# Patient Record
Sex: FEMALE | Race: WHITE | Employment: OTHER | URBAN - METROPOLITAN AREA
[De-identification: names, ages, dates, MRNs, and addresses within clinical notes are randomized per-mention and may not be internally consistent; named-entity substitution may affect disease eponyms.]

---

## 2017-02-21 PROBLEM — G25.81 RLS (RESTLESS LEGS SYNDROME): Status: ACTIVE | Noted: 2017-02-21

## 2017-02-21 PROBLEM — G43.019 INTRACTABLE MIGRAINE WITHOUT AURA AND WITHOUT STATUS MIGRAINOSUS: Status: ACTIVE | Noted: 2017-02-21

## 2019-12-09 PROBLEM — E78.00 PURE HYPERCHOLESTEROLEMIA: Status: ACTIVE | Noted: 2019-12-09

## 2019-12-09 PROBLEM — M06.4 INFLAMMATORY POLYARTHROPATHY (HCC): Status: ACTIVE | Noted: 2019-12-09

## 2020-08-11 PROBLEM — M06.9 RHEUMATOID ARTHRITIS, INVOLVING UNSPECIFIED SITE, UNSPECIFIED RHEUMATOID FACTOR PRESENCE: Status: ACTIVE | Noted: 2020-08-11

## 2020-08-11 PROBLEM — M67.912 TENDINOPATHY OF LEFT ROTATOR CUFF: Status: ACTIVE | Noted: 2020-08-11

## 2020-08-11 PROBLEM — M75.42 SUBACROMIAL IMPINGEMENT OF LEFT SHOULDER: Status: ACTIVE | Noted: 2020-08-11

## 2020-08-11 PROBLEM — M67.922 BICEPS TENDINOPATHY, LEFT: Status: ACTIVE | Noted: 2020-08-11

## 2020-09-01 PROBLEM — M19.012 OSTEOARTHRITIS OF LEFT ACROMIOCLAVICULAR JOINT: Status: ACTIVE | Noted: 2020-09-01

## 2020-10-06 PROBLEM — M06.9 RHEUMATOID ARTHRITIS (HCC): Status: ACTIVE | Noted: 2020-08-11

## 2022-01-25 PROBLEM — E11.9 CONTROLLED TYPE 2 DIABETES MELLITUS WITHOUT COMPLICATION, WITHOUT LONG-TERM CURRENT USE OF INSULIN (HCC): Status: ACTIVE | Noted: 2022-01-25

## 2022-01-25 PROBLEM — F33.42 RECURRENT MAJOR DEPRESSIVE DISORDER, IN FULL REMISSION (HCC): Status: ACTIVE | Noted: 2022-01-25

## 2022-01-25 PROBLEM — F33.42 RECURRENT MAJOR DEPRESSIVE DISORDER, IN FULL REMISSION: Status: ACTIVE | Noted: 2022-01-25

## 2022-04-19 ENCOUNTER — LAB ENCOUNTER (OUTPATIENT)
Dept: LAB | Age: 72
End: 2022-04-19
Attending: INTERNAL MEDICINE
Payer: MEDICARE

## 2022-04-19 DIAGNOSIS — M05.79 SEROPOSITIVE RHEUMATOID ARTHRITIS OF MULTIPLE SITES (HCC): Primary | ICD-10-CM

## 2022-04-19 DIAGNOSIS — M89.49 OSTEOARTHROSIS MULTIPLE SITES, NOT SPECIFIED AS GENERALIZED: ICD-10-CM

## 2022-04-19 DIAGNOSIS — Z51.81 ENCOUNTER FOR THERAPEUTIC DRUG MONITORING: ICD-10-CM

## 2022-04-19 LAB
ALBUMIN SERPL-MCNC: 4 G/DL (ref 3.4–5)
ALBUMIN/GLOB SERPL: 1.3 {RATIO} (ref 1–2)
ALP LIVER SERPL-CCNC: 50 U/L
ALT SERPL-CCNC: 32 U/L
ANION GAP SERPL CALC-SCNC: 6 MMOL/L (ref 0–18)
AST SERPL-CCNC: 12 U/L (ref 15–37)
BASOPHILS # BLD AUTO: 0.09 X10(3) UL (ref 0–0.2)
BASOPHILS NFR BLD AUTO: 0.8 %
BILIRUB SERPL-MCNC: 0.4 MG/DL (ref 0.1–2)
BUN BLD-MCNC: 13 MG/DL (ref 7–18)
CALCIUM BLD-MCNC: 9.7 MG/DL (ref 8.5–10.1)
CHLORIDE SERPL-SCNC: 102 MMOL/L (ref 98–112)
CO2 SERPL-SCNC: 29 MMOL/L (ref 21–32)
CREAT BLD-MCNC: 0.6 MG/DL
CRP SERPL-MCNC: 0.8 MG/DL (ref ?–0.3)
EOSINOPHIL # BLD AUTO: 0.15 X10(3) UL (ref 0–0.7)
EOSINOPHIL NFR BLD AUTO: 1.3 %
ERYTHROCYTE [DISTWIDTH] IN BLOOD BY AUTOMATED COUNT: 13.2 %
ERYTHROCYTE [SEDIMENTATION RATE] IN BLOOD: 16 MM/HR
FASTING STATUS PATIENT QL REPORTED: YES
GLOBULIN PLAS-MCNC: 3 G/DL (ref 2.8–4.4)
GLUCOSE BLD-MCNC: 105 MG/DL (ref 70–99)
HCT VFR BLD AUTO: 42.4 %
HGB BLD-MCNC: 13.9 G/DL
IGA SERPL-MCNC: 158 MG/DL (ref 70–312)
IGM SERPL-MCNC: 39.6 MG/DL (ref 43–279)
IMM GRANULOCYTES # BLD AUTO: 0.04 X10(3) UL (ref 0–1)
IMM GRANULOCYTES NFR BLD: 0.4 %
IMMUNOGLOBULIN PNL SER-MCNC: 755 MG/DL (ref 791–1643)
LYMPHOCYTES # BLD AUTO: 3.3 X10(3) UL (ref 1–4)
LYMPHOCYTES NFR BLD AUTO: 29.1 %
MCH RBC QN AUTO: 32 PG (ref 26–34)
MCHC RBC AUTO-ENTMCNC: 32.8 G/DL (ref 31–37)
MCV RBC AUTO: 97.5 FL
MONOCYTES # BLD AUTO: 0.83 X10(3) UL (ref 0.1–1)
MONOCYTES NFR BLD AUTO: 7.3 %
NEUTROPHILS # BLD AUTO: 6.94 X10 (3) UL (ref 1.5–7.7)
NEUTROPHILS # BLD AUTO: 6.94 X10(3) UL (ref 1.5–7.7)
NEUTROPHILS NFR BLD AUTO: 61.1 %
OSMOLALITY SERPL CALC.SUM OF ELEC: 284 MOSM/KG (ref 275–295)
PLATELET # BLD AUTO: 302 10(3)UL (ref 150–450)
POTASSIUM SERPL-SCNC: 4.3 MMOL/L (ref 3.5–5.1)
PROT SERPL-MCNC: 7 G/DL (ref 6.4–8.2)
RBC # BLD AUTO: 4.35 X10(6)UL
SODIUM SERPL-SCNC: 137 MMOL/L (ref 136–145)
WBC # BLD AUTO: 11.4 X10(3) UL (ref 4–11)

## 2022-04-19 PROCEDURE — 85652 RBC SED RATE AUTOMATED: CPT

## 2022-04-19 PROCEDURE — 86334 IMMUNOFIX E-PHORESIS SERUM: CPT

## 2022-04-19 PROCEDURE — 86140 C-REACTIVE PROTEIN: CPT

## 2022-04-19 PROCEDURE — 85025 COMPLETE CBC W/AUTO DIFF WBC: CPT

## 2022-04-19 PROCEDURE — 80053 COMPREHEN METABOLIC PANEL: CPT

## 2022-04-19 PROCEDURE — 82784 ASSAY IGA/IGD/IGG/IGM EACH: CPT

## 2022-06-14 ENCOUNTER — LAB ENCOUNTER (OUTPATIENT)
Dept: LAB | Age: 72
End: 2022-06-14
Attending: FAMILY MEDICINE
Payer: MEDICARE

## 2022-06-14 DIAGNOSIS — M05.79 SEROPOSITIVE RHEUMATOID ARTHRITIS OF MULTIPLE SITES (HCC): ICD-10-CM

## 2022-06-14 DIAGNOSIS — Z51.81 ENCOUNTER FOR THERAPEUTIC DRUG MONITORING: ICD-10-CM

## 2022-06-14 DIAGNOSIS — M89.49 OSTEOARTHROSIS MULTIPLE SITES, NOT SPECIFIED AS GENERALIZED: ICD-10-CM

## 2022-06-14 LAB
ALBUMIN SERPL-MCNC: 3.8 G/DL (ref 3.4–5)
ALBUMIN/GLOB SERPL: 1.1 {RATIO} (ref 1–2)
ALP LIVER SERPL-CCNC: 58 U/L
ALT SERPL-CCNC: 28 U/L
ANION GAP SERPL CALC-SCNC: 6 MMOL/L (ref 0–18)
AST SERPL-CCNC: 9 U/L (ref 15–37)
BASOPHILS # BLD AUTO: 0.07 X10(3) UL (ref 0–0.2)
BASOPHILS NFR BLD AUTO: 0.6 %
BILIRUB SERPL-MCNC: 0.3 MG/DL (ref 0.1–2)
BUN BLD-MCNC: 19 MG/DL (ref 7–18)
CALCIUM BLD-MCNC: 9.2 MG/DL (ref 8.5–10.1)
CHLORIDE SERPL-SCNC: 104 MMOL/L (ref 98–112)
CO2 SERPL-SCNC: 27 MMOL/L (ref 21–32)
CREAT BLD-MCNC: 0.7 MG/DL
CRP SERPL-MCNC: 0.84 MG/DL (ref ?–0.3)
EOSINOPHIL # BLD AUTO: 0.15 X10(3) UL (ref 0–0.7)
EOSINOPHIL NFR BLD AUTO: 1.2 %
ERYTHROCYTE [DISTWIDTH] IN BLOOD BY AUTOMATED COUNT: 13.3 %
ERYTHROCYTE [SEDIMENTATION RATE] IN BLOOD: 15 MM/HR
FASTING STATUS PATIENT QL REPORTED: YES
GLOBULIN PLAS-MCNC: 3.6 G/DL (ref 2.8–4.4)
GLUCOSE BLD-MCNC: 120 MG/DL (ref 70–99)
HCT VFR BLD AUTO: 44.5 %
HGB BLD-MCNC: 14 G/DL
IGA SERPL-MCNC: 156 MG/DL (ref 70–312)
IGM SERPL-MCNC: 35.8 MG/DL (ref 43–279)
IMM GRANULOCYTES # BLD AUTO: 0.05 X10(3) UL (ref 0–1)
IMM GRANULOCYTES NFR BLD: 0.4 %
IMMUNOGLOBULIN PNL SER-MCNC: 864 MG/DL (ref 791–1643)
LYMPHOCYTES # BLD AUTO: 3.69 X10(3) UL (ref 1–4)
LYMPHOCYTES NFR BLD AUTO: 29.4 %
MCH RBC QN AUTO: 31.8 PG (ref 26–34)
MCHC RBC AUTO-ENTMCNC: 31.5 G/DL (ref 31–37)
MCV RBC AUTO: 101.1 FL
MONOCYTES # BLD AUTO: 0.76 X10(3) UL (ref 0.1–1)
MONOCYTES NFR BLD AUTO: 6.1 %
NEUTROPHILS # BLD AUTO: 7.82 X10 (3) UL (ref 1.5–7.7)
NEUTROPHILS # BLD AUTO: 7.82 X10(3) UL (ref 1.5–7.7)
NEUTROPHILS NFR BLD AUTO: 62.3 %
OSMOLALITY SERPL CALC.SUM OF ELEC: 287 MOSM/KG (ref 275–295)
PLATELET # BLD AUTO: 314 10(3)UL (ref 150–450)
POTASSIUM SERPL-SCNC: 4.2 MMOL/L (ref 3.5–5.1)
PROT SERPL-MCNC: 7.4 G/DL (ref 6.4–8.2)
RBC # BLD AUTO: 4.4 X10(6)UL
SODIUM SERPL-SCNC: 137 MMOL/L (ref 136–145)
WBC # BLD AUTO: 12.5 X10(3) UL (ref 4–11)

## 2022-06-14 PROCEDURE — 86140 C-REACTIVE PROTEIN: CPT

## 2022-06-14 PROCEDURE — 86334 IMMUNOFIX E-PHORESIS SERUM: CPT

## 2022-06-14 PROCEDURE — 85025 COMPLETE CBC W/AUTO DIFF WBC: CPT

## 2022-06-14 PROCEDURE — 80053 COMPREHEN METABOLIC PANEL: CPT

## 2022-06-14 PROCEDURE — 82784 ASSAY IGA/IGD/IGG/IGM EACH: CPT

## 2022-06-14 PROCEDURE — 85652 RBC SED RATE AUTOMATED: CPT

## 2022-07-12 DIAGNOSIS — Z51.81 MEDICATION MONITORING ENCOUNTER: ICD-10-CM

## 2022-07-12 DIAGNOSIS — M85.89 OSTEOPENIA OF MULTIPLE SITES: ICD-10-CM

## 2022-07-12 DIAGNOSIS — M15.9 PRIMARY OSTEOARTHRITIS INVOLVING MULTIPLE JOINTS: ICD-10-CM

## 2022-07-12 DIAGNOSIS — M05.79 RHEUMATOID ARTHRITIS INVOLVING MULTIPLE SITES WITH POSITIVE RHEUMATOID FACTOR (CMD): Primary | ICD-10-CM

## 2022-07-18 ENCOUNTER — HOSPITAL ENCOUNTER (OUTPATIENT)
Dept: PHYSICAL MEDICINE AND REHAB | Age: 72
Discharge: STILL ADMITTED OR ADMINISTRATIVE | End: 2022-07-18
Attending: INTERNAL MEDICINE

## 2022-07-18 PROCEDURE — 97162 PT EVAL MOD COMPLEX 30 MIN: CPT | Performed by: PHYSICAL THERAPIST

## 2022-07-18 PROCEDURE — 97110 THERAPEUTIC EXERCISES: CPT | Performed by: PHYSICAL THERAPIST

## 2022-07-18 ASSESSMENT — MOVEMENT AND STRENGTH ASSESSMENTS
SITTING FOR 1 HOUR: NO DIFFICULTY
RUNNING ON UNEVEN GROUND: EXTREME DIFFICULTY OR UNABLE TO PERFORM ACTIVITY
SQUATTING: NO DIFFICULTY
GOING UP OR DOWN 10 STAIRS (ABOUT 1 FLIGHT OF STAIRS): A LITTLE BIT OF DIFFICULTY
TOTAL SCORE: 57.5
RUNNING ON EVEN GROUND: EXTREME DIFFICULTY OR UNABLE TO PERFORM ACTIVITY
PUTTING ON YOUR SHOES OR SOCKS: NO DIFFICULTY
LIFTING AN OBJECT, LIKE A BAG OF GROCERIES, FROM THE FLOOR: A LITTLE BIT OF DIFFICULTY
PERFORMING HEAVY ACTIVITIES AROUND YOUR HOME: QUITE A BIT OF DIFFICULTY
WALKING BETWEEN ROOMS: MODERATE DIFFICULTY
WALKING 2 BLOCKS: A LITTLE BIT OF DIFFICULTY
YOUR USUAL HOBBIES, RECREATIONAL OR SPORTING ACTIVIITIES: MODERATE DIFFICULTY
HOPPING: EXTREME DIFFICULTY OR UNABLE TO PERFORM ACTIVITY
GETTING INTO OR OUT OF THE BATH: MODERATE DIFFICULTY
GETTING INTO OR OUT OF A CAR: NO DIFFICULTY
PERFORMING LIGHT ACTIVITES AROUND YOUR HOME: MODERATE DIFFICULTY
WALKING A MILE: EXTREME DIFFICULTY OR UNABLE TO PERFORM ACTIVITY
MAKING SHARP TURNS WHILE RUNNING FAST: A LITTLE BIT OF DIFFICULTY
ANY OF YOUR USUAL WORK, HOUSEWORK OR SCHOOL ACTIVITIES: A LITTLE BIT OF DIFFICULTY
STANDING FOR 1 HOUR: A LITTLE BIT OF DIFFICULTY
ROLLING OVER IN BED: A LITTLE BIT OF DIFFICULTY

## 2022-07-18 ASSESSMENT — ENCOUNTER SYMPTOMS
QUALITY: SORE
PAIN SEVERITY NOW: 3
SUBJECTIVE PAIN PROGRESSION: WORSENING
QUALITY: ACHE
ALLEVIATING FACTORS: REST
ALLEVIATING FACTORS: AVOIDING MOVEMENT IN INVOLVED AREA

## 2022-08-08 ENCOUNTER — LAB ENCOUNTER (OUTPATIENT)
Dept: LAB | Age: 72
End: 2022-08-08
Attending: INTERNAL MEDICINE
Payer: MEDICARE

## 2022-08-08 DIAGNOSIS — M89.49 OSTEOARTHROSIS MULTIPLE SITES, NOT SPECIFIED AS GENERALIZED: ICD-10-CM

## 2022-08-08 DIAGNOSIS — Z51.81 ENCOUNTER FOR THERAPEUTIC DRUG MONITORING: ICD-10-CM

## 2022-08-08 DIAGNOSIS — M05.79 SEROPOSITIVE RHEUMATOID ARTHRITIS OF MULTIPLE SITES (HCC): ICD-10-CM

## 2022-08-08 LAB
ALBUMIN SERPL-MCNC: 3.9 G/DL (ref 3.4–5)
ALBUMIN/GLOB SERPL: 1.1 {RATIO} (ref 1–2)
ALP LIVER SERPL-CCNC: 51 U/L
ALT SERPL-CCNC: 36 U/L
ANION GAP SERPL CALC-SCNC: 12 MMOL/L (ref 0–18)
AST SERPL-CCNC: 23 U/L (ref 15–37)
BASOPHILS # BLD AUTO: 0.06 X10(3) UL (ref 0–0.2)
BASOPHILS NFR BLD AUTO: 0.7 %
BILIRUB SERPL-MCNC: 0.5 MG/DL (ref 0.1–2)
BUN BLD-MCNC: 12 MG/DL (ref 7–18)
BUN/CREAT SERPL: 20.3 (ref 10–20)
CALCIUM BLD-MCNC: 9.8 MG/DL (ref 8.5–10.1)
CHLORIDE SERPL-SCNC: 93 MMOL/L (ref 98–112)
CO2 SERPL-SCNC: 25 MMOL/L (ref 21–32)
CREAT BLD-MCNC: 0.59 MG/DL
CRP SERPL-MCNC: 0.68 MG/DL (ref ?–0.3)
DEPRECATED RDW RBC AUTO: 46.4 FL (ref 35.1–46.3)
EOSINOPHIL # BLD AUTO: 0.13 X10(3) UL (ref 0–0.7)
EOSINOPHIL NFR BLD AUTO: 1.4 %
ERYTHROCYTE [DISTWIDTH] IN BLOOD BY AUTOMATED COUNT: 13.1 % (ref 11–15)
ERYTHROCYTE [SEDIMENTATION RATE] IN BLOOD: 4 MM/HR
FASTING STATUS PATIENT QL REPORTED: YES
GFR SERPLBLD BASED ON 1.73 SQ M-ARVRAT: 97 ML/MIN/1.73M2 (ref 60–?)
GLOBULIN PLAS-MCNC: 3.5 G/DL (ref 2.8–4.4)
GLUCOSE BLD-MCNC: 101 MG/DL (ref 70–99)
HCT VFR BLD AUTO: 45.3 %
HGB BLD-MCNC: 14.7 G/DL
IGA SERPL-MCNC: 157 MG/DL (ref 70–312)
IGM SERPL-MCNC: 28.4 MG/DL (ref 43–279)
IMM GRANULOCYTES # BLD AUTO: 0.03 X10(3) UL (ref 0–1)
IMM GRANULOCYTES NFR BLD: 0.3 %
IMMUNOGLOBULIN PNL SER-MCNC: 739 MG/DL (ref 791–1643)
LYMPHOCYTES # BLD AUTO: 2.23 X10(3) UL (ref 1–4)
LYMPHOCYTES NFR BLD AUTO: 24.4 %
MCH RBC QN AUTO: 31.3 PG (ref 26–34)
MCHC RBC AUTO-ENTMCNC: 32.5 G/DL (ref 31–37)
MCV RBC AUTO: 96.4 FL
MONOCYTES # BLD AUTO: 0.68 X10(3) UL (ref 0.1–1)
MONOCYTES NFR BLD AUTO: 7.4 %
NEUTROPHILS # BLD AUTO: 6 X10 (3) UL (ref 1.5–7.7)
NEUTROPHILS # BLD AUTO: 6 X10(3) UL (ref 1.5–7.7)
NEUTROPHILS NFR BLD AUTO: 65.8 %
OSMOLALITY SERPL CALC.SUM OF ELEC: 270 MOSM/KG (ref 275–295)
PLATELET # BLD AUTO: 262 10(3)UL (ref 150–450)
POTASSIUM SERPL-SCNC: 3.7 MMOL/L (ref 3.5–5.1)
PROT SERPL-MCNC: 7.4 G/DL (ref 6.4–8.2)
RBC # BLD AUTO: 4.7 X10(6)UL
SODIUM SERPL-SCNC: 130 MMOL/L (ref 136–145)
WBC # BLD AUTO: 9.1 X10(3) UL (ref 4–11)

## 2022-08-08 PROCEDURE — 86140 C-REACTIVE PROTEIN: CPT

## 2022-08-08 PROCEDURE — 85652 RBC SED RATE AUTOMATED: CPT

## 2022-08-08 PROCEDURE — 82784 ASSAY IGA/IGD/IGG/IGM EACH: CPT

## 2022-08-08 PROCEDURE — 86334 IMMUNOFIX E-PHORESIS SERUM: CPT

## 2022-08-08 PROCEDURE — 85025 COMPLETE CBC W/AUTO DIFF WBC: CPT

## 2022-08-08 PROCEDURE — 80053 COMPREHEN METABOLIC PANEL: CPT

## 2022-10-10 ENCOUNTER — OFFICE VISIT (OUTPATIENT)
Dept: SLEEP CENTER | Age: 72
End: 2022-10-10
Attending: INTERNAL MEDICINE
Payer: MEDICARE

## 2022-10-10 DIAGNOSIS — G47.33 OSA (OBSTRUCTIVE SLEEP APNEA): ICD-10-CM

## 2022-10-10 PROCEDURE — 95810 POLYSOM 6/> YRS 4/> PARAM: CPT

## 2022-11-02 ENCOUNTER — SLEEP STUDY (OUTPATIENT)
Facility: CLINIC | Age: 72
End: 2022-11-02
Payer: MEDICARE

## 2022-11-02 DIAGNOSIS — G47.33 OBSTRUCTIVE SLEEP APNEA SYNDROME: Primary | ICD-10-CM

## 2022-11-02 PROCEDURE — 95810 POLYSOM 6/> YRS 4/> PARAM: CPT | Performed by: OTHER

## 2022-11-09 ENCOUNTER — LAB ENCOUNTER (OUTPATIENT)
Dept: LAB | Age: 72
End: 2022-11-09
Attending: INTERNAL MEDICINE
Payer: MEDICARE

## 2022-11-09 DIAGNOSIS — M05.79 SEROPOSITIVE RHEUMATOID ARTHRITIS OF MULTIPLE SITES (HCC): ICD-10-CM

## 2022-11-09 DIAGNOSIS — M89.49 OSTEOARTHROSIS MULTIPLE SITES, NOT SPECIFIED AS GENERALIZED: ICD-10-CM

## 2022-11-09 DIAGNOSIS — Z51.81 ENCOUNTER FOR THERAPEUTIC DRUG MONITORING: ICD-10-CM

## 2022-11-09 LAB
ALBUMIN SERPL-MCNC: 4 G/DL (ref 3.4–5)
ALBUMIN/GLOB SERPL: 1.3 {RATIO} (ref 1–2)
ALP LIVER SERPL-CCNC: 54 U/L
ALT SERPL-CCNC: 24 U/L
ANION GAP SERPL CALC-SCNC: 10 MMOL/L (ref 0–18)
AST SERPL-CCNC: 10 U/L (ref 15–37)
BASOPHILS # BLD AUTO: 0.08 X10(3) UL (ref 0–0.2)
BASOPHILS NFR BLD AUTO: 0.6 %
BILIRUB SERPL-MCNC: 0.5 MG/DL (ref 0.1–2)
BUN BLD-MCNC: 14 MG/DL (ref 7–18)
BUN/CREAT SERPL: 23.7 (ref 10–20)
CALCIUM BLD-MCNC: 10 MG/DL (ref 8.5–10.1)
CHLORIDE SERPL-SCNC: 98 MMOL/L (ref 98–112)
CO2 SERPL-SCNC: 28 MMOL/L (ref 21–32)
CREAT BLD-MCNC: 0.59 MG/DL
CRP SERPL-MCNC: 0.75 MG/DL (ref ?–0.3)
DEPRECATED RDW RBC AUTO: 50.5 FL (ref 35.1–46.3)
EOSINOPHIL # BLD AUTO: 0.07 X10(3) UL (ref 0–0.7)
EOSINOPHIL NFR BLD AUTO: 0.5 %
ERYTHROCYTE [DISTWIDTH] IN BLOOD BY AUTOMATED COUNT: 13.7 % (ref 11–15)
ERYTHROCYTE [SEDIMENTATION RATE] IN BLOOD: 19 MM/HR
FASTING STATUS PATIENT QL REPORTED: YES
GFR SERPLBLD BASED ON 1.73 SQ M-ARVRAT: 97 ML/MIN/1.73M2 (ref 60–?)
GLOBULIN PLAS-MCNC: 3.1 G/DL (ref 2.8–4.4)
GLUCOSE BLD-MCNC: 95 MG/DL (ref 70–99)
HCT VFR BLD AUTO: 45.4 %
HGB BLD-MCNC: 14.4 G/DL
IGA SERPL-MCNC: 162 MG/DL (ref 70–312)
IGM SERPL-MCNC: 50.3 MG/DL (ref 43–279)
IMM GRANULOCYTES # BLD AUTO: 0.05 X10(3) UL (ref 0–1)
IMM GRANULOCYTES NFR BLD: 0.3 %
IMMUNOGLOBULIN PNL SER-MCNC: 776 MG/DL (ref 791–1643)
LYMPHOCYTES # BLD AUTO: 3.29 X10(3) UL (ref 1–4)
LYMPHOCYTES NFR BLD AUTO: 22.9 %
MCH RBC QN AUTO: 31.9 PG (ref 26–34)
MCHC RBC AUTO-ENTMCNC: 31.7 G/DL (ref 31–37)
MCV RBC AUTO: 100.4 FL
MONOCYTES # BLD AUTO: 1 X10(3) UL (ref 0.1–1)
MONOCYTES NFR BLD AUTO: 6.9 %
NEUTROPHILS # BLD AUTO: 9.9 X10 (3) UL (ref 1.5–7.7)
NEUTROPHILS # BLD AUTO: 9.9 X10(3) UL (ref 1.5–7.7)
NEUTROPHILS NFR BLD AUTO: 68.8 %
OSMOLALITY SERPL CALC.SUM OF ELEC: 282 MOSM/KG (ref 275–295)
PLATELET # BLD AUTO: 343 10(3)UL (ref 150–450)
POTASSIUM SERPL-SCNC: 4.6 MMOL/L (ref 3.5–5.1)
PROT SERPL-MCNC: 7.1 G/DL (ref 6.4–8.2)
RBC # BLD AUTO: 4.52 X10(6)UL
SODIUM SERPL-SCNC: 136 MMOL/L (ref 136–145)
WBC # BLD AUTO: 14.4 X10(3) UL (ref 4–11)

## 2022-11-09 PROCEDURE — 85025 COMPLETE CBC W/AUTO DIFF WBC: CPT

## 2022-11-09 PROCEDURE — 80053 COMPREHEN METABOLIC PANEL: CPT

## 2022-11-09 PROCEDURE — 85652 RBC SED RATE AUTOMATED: CPT

## 2022-11-09 PROCEDURE — 86334 IMMUNOFIX E-PHORESIS SERUM: CPT

## 2022-11-09 PROCEDURE — 86140 C-REACTIVE PROTEIN: CPT

## 2022-11-09 PROCEDURE — 82784 ASSAY IGA/IGD/IGG/IGM EACH: CPT

## 2022-12-13 ENCOUNTER — OFFICE VISIT (OUTPATIENT)
Facility: CLINIC | Age: 72
End: 2022-12-13
Payer: MEDICARE

## 2022-12-13 DIAGNOSIS — G47.36 HYPOXEMIA ASSOCIATED WITH SLEEP: ICD-10-CM

## 2022-12-13 DIAGNOSIS — E11.9 CONTROLLED TYPE 2 DIABETES MELLITUS WITHOUT COMPLICATION, WITHOUT LONG-TERM CURRENT USE OF INSULIN (HCC): ICD-10-CM

## 2022-12-13 DIAGNOSIS — G47.33 OBSTRUCTIVE SLEEP APNEA: Primary | ICD-10-CM

## 2022-12-13 DIAGNOSIS — G43.019 INTRACTABLE MIGRAINE WITHOUT AURA AND WITHOUT STATUS MIGRAINOSUS: ICD-10-CM

## 2022-12-13 DIAGNOSIS — G47.01 INSOMNIA DUE TO MEDICAL CONDITION: ICD-10-CM

## 2022-12-13 DIAGNOSIS — I10 ESSENTIAL HYPERTENSION: ICD-10-CM

## 2022-12-13 DIAGNOSIS — G25.81 RLS (RESTLESS LEGS SYNDROME): ICD-10-CM

## 2022-12-13 PROCEDURE — 99204 OFFICE O/P NEW MOD 45 MIN: CPT | Performed by: OTHER

## 2022-12-16 DIAGNOSIS — G43.909 MIGRAINE: ICD-10-CM

## 2022-12-19 ENCOUNTER — ORDER TRANSCRIPTION (OUTPATIENT)
Dept: SLEEP CENTER | Age: 72
End: 2022-12-19

## 2022-12-19 ENCOUNTER — TELEPHONE (OUTPATIENT)
Dept: SLEEP CENTER | Age: 72
End: 2022-12-19

## 2022-12-19 DIAGNOSIS — Z01.818 PREPROCEDURAL GENERAL PHYSICAL EXAMINATION: Primary | ICD-10-CM

## 2022-12-19 DIAGNOSIS — Z11.59 SCREENING FOR VIRAL DISEASE: ICD-10-CM

## 2022-12-19 RX ORDER — ALBUTEROL SULFATE 90 UG/1
AEROSOL, METERED RESPIRATORY (INHALATION)
Qty: 8.5 EACH | Refills: 6 | Status: SHIPPED | OUTPATIENT
Start: 2022-12-19

## 2022-12-28 ENCOUNTER — OFFICE VISIT (OUTPATIENT)
Facility: CLINIC | Age: 72
End: 2022-12-28
Payer: MEDICARE

## 2022-12-28 VITALS
BODY MASS INDEX: 44.44 KG/M2 | DIASTOLIC BLOOD PRESSURE: 90 MMHG | HEIGHT: 57 IN | SYSTOLIC BLOOD PRESSURE: 178 MMHG | HEART RATE: 91 BPM | WEIGHT: 206 LBS | OXYGEN SATURATION: 97 % | RESPIRATION RATE: 16 BRPM

## 2022-12-28 DIAGNOSIS — R05.3 CHRONIC COUGH: Primary | ICD-10-CM

## 2022-12-28 PROCEDURE — 99214 OFFICE O/P EST MOD 30 MIN: CPT | Performed by: INTERNAL MEDICINE

## 2022-12-28 RX ORDER — GABAPENTIN 300 MG/1
300 CAPSULE ORAL 2 TIMES DAILY
COMMUNITY
Start: 2022-10-21

## 2022-12-28 NOTE — PATIENT INSTRUCTIONS
-Plan:  -resume breo - daily   - same sinus meds   - continue GERD meds   - for repeat sleep study   -please contact primary office about getting new BP med - or double up on water pill for now - and follow blood pressure at home and record   - see me in 3 months     Amy Ramos MD  Pulmonary Medicine  12/28/2022

## 2022-12-29 ENCOUNTER — PATIENT MESSAGE (OUTPATIENT)
Facility: CLINIC | Age: 72
End: 2022-12-29

## 2022-12-29 DIAGNOSIS — R05.3 CHRONIC COUGH: Primary | ICD-10-CM

## 2022-12-29 RX ORDER — FLUTICASONE FUROATE AND VILANTEROL 100; 25 UG/1; UG/1
1 POWDER RESPIRATORY (INHALATION) DAILY
Qty: 60 EACH | Refills: 5 | Status: SHIPPED | OUTPATIENT
Start: 2022-12-29

## 2022-12-29 NOTE — TELEPHONE ENCOUNTER
From: Virgen Piper  To: Paramjit Lewis MD  Sent: 12/29/2022 8:49 AM CST  Subject: Prescription order    Dr. Luli June did not send an order for Breo to [facility] yesterday. Please send.

## 2023-01-24 ENCOUNTER — LAB ENCOUNTER (OUTPATIENT)
Dept: LAB | Age: 73
End: 2023-01-24
Attending: Other
Payer: MEDICARE

## 2023-01-24 DIAGNOSIS — Z11.59 SCREENING FOR VIRAL DISEASE: ICD-10-CM

## 2023-01-24 DIAGNOSIS — Z01.818 PREPROCEDURAL GENERAL PHYSICAL EXAMINATION: ICD-10-CM

## 2023-01-25 LAB — SARS-COV-2 RNA RESP QL NAA+PROBE: NOT DETECTED

## 2023-01-29 ENCOUNTER — OFFICE VISIT (OUTPATIENT)
Dept: SLEEP CENTER | Age: 73
End: 2023-01-29
Attending: Other
Payer: MEDICARE

## 2023-01-29 DIAGNOSIS — G25.81 RLS (RESTLESS LEGS SYNDROME): ICD-10-CM

## 2023-01-29 DIAGNOSIS — G47.33 OBSTRUCTIVE SLEEP APNEA: ICD-10-CM

## 2023-01-29 DIAGNOSIS — G47.01 INSOMNIA DUE TO MEDICAL CONDITION: ICD-10-CM

## 2023-01-29 DIAGNOSIS — G47.36 HYPOXEMIA ASSOCIATED WITH SLEEP: ICD-10-CM

## 2023-01-29 PROCEDURE — 95811 POLYSOM 6/>YRS CPAP 4/> PARM: CPT

## 2023-02-09 ENCOUNTER — SLEEP STUDY (OUTPATIENT)
Facility: CLINIC | Age: 73
End: 2023-02-09
Payer: MEDICARE

## 2023-02-09 DIAGNOSIS — G47.33 OBSTRUCTIVE SLEEP APNEA: Primary | ICD-10-CM

## 2023-02-09 PROCEDURE — 95811 POLYSOM 6/>YRS CPAP 4/> PARM: CPT | Performed by: OTHER

## 2023-02-13 ENCOUNTER — OFFICE VISIT (OUTPATIENT)
Facility: CLINIC | Age: 73
End: 2023-02-13
Payer: MEDICARE

## 2023-02-13 VITALS
OXYGEN SATURATION: 98 % | SYSTOLIC BLOOD PRESSURE: 138 MMHG | RESPIRATION RATE: 18 BRPM | WEIGHT: 203 LBS | BODY MASS INDEX: 43.8 KG/M2 | DIASTOLIC BLOOD PRESSURE: 84 MMHG | HEART RATE: 102 BPM | HEIGHT: 57 IN

## 2023-02-13 DIAGNOSIS — G47.33 OBSTRUCTIVE SLEEP APNEA: Primary | ICD-10-CM

## 2023-02-13 DIAGNOSIS — G47.09 OTHER INSOMNIA: ICD-10-CM

## 2023-02-13 DIAGNOSIS — E66.09 CLASS 2 OBESITY DUE TO EXCESS CALORIES WITHOUT SERIOUS COMORBIDITY IN ADULT, UNSPECIFIED BMI: ICD-10-CM

## 2023-02-13 PROBLEM — E66.812 CLASS 2 OBESITY DUE TO EXCESS CALORIES WITHOUT SERIOUS COMORBIDITY IN ADULT: Status: ACTIVE | Noted: 2023-02-13

## 2023-02-13 PROCEDURE — 99204 OFFICE O/P NEW MOD 45 MIN: CPT | Performed by: OTHER

## 2023-02-13 RX ORDER — AMLODIPINE BESYLATE 10 MG/1
10 TABLET ORAL DAILY
COMMUNITY
Start: 2023-01-30

## 2023-02-21 ENCOUNTER — LAB ENCOUNTER (OUTPATIENT)
Dept: LAB | Age: 73
End: 2023-02-21
Attending: INTERNAL MEDICINE
Payer: MEDICARE

## 2023-02-21 DIAGNOSIS — Z51.81 ENCOUNTER FOR THERAPEUTIC DRUG MONITORING: ICD-10-CM

## 2023-02-21 DIAGNOSIS — M89.49 OSTEOARTHROSIS MULTIPLE SITES, NOT SPECIFIED AS GENERALIZED: ICD-10-CM

## 2023-02-21 DIAGNOSIS — M05.79 SEROPOSITIVE RHEUMATOID ARTHRITIS OF MULTIPLE SITES (HCC): ICD-10-CM

## 2023-02-21 LAB
ALBUMIN SERPL-MCNC: 3.9 G/DL (ref 3.4–5)
ALBUMIN/GLOB SERPL: 1.1 {RATIO} (ref 1–2)
ALP LIVER SERPL-CCNC: 55 U/L
ALT SERPL-CCNC: 33 U/L
ANION GAP SERPL CALC-SCNC: 4 MMOL/L (ref 0–18)
AST SERPL-CCNC: 16 U/L (ref 15–37)
BASOPHILS # BLD AUTO: 0.08 X10(3) UL (ref 0–0.2)
BASOPHILS NFR BLD AUTO: 0.6 %
BILIRUB SERPL-MCNC: 0.4 MG/DL (ref 0.1–2)
BUN BLD-MCNC: 14 MG/DL (ref 7–18)
CALCIUM BLD-MCNC: 10.1 MG/DL (ref 8.5–10.1)
CHLORIDE SERPL-SCNC: 101 MMOL/L (ref 98–112)
CO2 SERPL-SCNC: 32 MMOL/L (ref 21–32)
CREAT BLD-MCNC: 0.66 MG/DL
CRP SERPL-MCNC: 1.98 MG/DL (ref ?–0.3)
EOSINOPHIL # BLD AUTO: 0.12 X10(3) UL (ref 0–0.7)
EOSINOPHIL NFR BLD AUTO: 0.9 %
ERYTHROCYTE [DISTWIDTH] IN BLOOD BY AUTOMATED COUNT: 13.2 %
ERYTHROCYTE [SEDIMENTATION RATE] IN BLOOD: 25 MM/HR
FASTING STATUS PATIENT QL REPORTED: YES
GFR SERPLBLD BASED ON 1.73 SQ M-ARVRAT: 94 ML/MIN/1.73M2 (ref 60–?)
GLOBULIN PLAS-MCNC: 3.7 G/DL (ref 2.8–4.4)
GLUCOSE BLD-MCNC: 120 MG/DL (ref 70–99)
HCT VFR BLD AUTO: 44.2 %
HGB BLD-MCNC: 14.5 G/DL
IGA SERPL-MCNC: 180 MG/DL (ref 70–312)
IGM SERPL-MCNC: 39.5 MG/DL (ref 43–279)
IMM GRANULOCYTES # BLD AUTO: 0.06 X10(3) UL (ref 0–1)
IMM GRANULOCYTES NFR BLD: 0.5 %
IMMUNOGLOBULIN PNL SER-MCNC: 840 MG/DL (ref 791–1643)
LYMPHOCYTES # BLD AUTO: 3.02 X10(3) UL (ref 1–4)
LYMPHOCYTES NFR BLD AUTO: 23.1 %
MCH RBC QN AUTO: 32.3 PG (ref 26–34)
MCHC RBC AUTO-ENTMCNC: 32.8 G/DL (ref 31–37)
MCV RBC AUTO: 98.4 FL
MONOCYTES # BLD AUTO: 1.03 X10(3) UL (ref 0.1–1)
MONOCYTES NFR BLD AUTO: 7.9 %
NEUTROPHILS # BLD AUTO: 8.74 X10 (3) UL (ref 1.5–7.7)
NEUTROPHILS # BLD AUTO: 8.74 X10(3) UL (ref 1.5–7.7)
NEUTROPHILS NFR BLD AUTO: 67 %
OSMOLALITY SERPL CALC.SUM OF ELEC: 286 MOSM/KG (ref 275–295)
PLATELET # BLD AUTO: 328 10(3)UL (ref 150–450)
POTASSIUM SERPL-SCNC: 4.6 MMOL/L (ref 3.5–5.1)
PROT SERPL-MCNC: 7.6 G/DL (ref 6.4–8.2)
RBC # BLD AUTO: 4.49 X10(6)UL
SODIUM SERPL-SCNC: 137 MMOL/L (ref 136–145)
WBC # BLD AUTO: 13.1 X10(3) UL (ref 4–11)

## 2023-02-21 PROCEDURE — 82784 ASSAY IGA/IGD/IGG/IGM EACH: CPT

## 2023-02-21 PROCEDURE — 85025 COMPLETE CBC W/AUTO DIFF WBC: CPT

## 2023-02-21 PROCEDURE — 86334 IMMUNOFIX E-PHORESIS SERUM: CPT

## 2023-02-21 PROCEDURE — 80053 COMPREHEN METABOLIC PANEL: CPT

## 2023-02-21 PROCEDURE — 85652 RBC SED RATE AUTOMATED: CPT

## 2023-02-21 PROCEDURE — 86140 C-REACTIVE PROTEIN: CPT

## 2023-03-02 ENCOUNTER — TELEPHONE (OUTPATIENT)
Facility: CLINIC | Age: 73
End: 2023-03-02

## 2023-03-02 DIAGNOSIS — G47.33 OSA (OBSTRUCTIVE SLEEP APNEA): Primary | ICD-10-CM

## 2023-03-02 DIAGNOSIS — I10 ESSENTIAL HYPERTENSION: ICD-10-CM

## 2023-03-02 NOTE — TELEPHONE ENCOUNTER
Pt notified cpap machine ordered to [facility]. DME will verify insurance and once approved will contact pt to arrange delivery and instructions. Pt instructed to follow up with Dr. Fabiana Dawn once pt starts PAP therapy per insurance compliance requirement. Pt verbalized understanding of instructions and agrees with the plan.

## 2023-03-28 ENCOUNTER — OFFICE VISIT (OUTPATIENT)
Facility: CLINIC | Age: 73
End: 2023-03-28
Payer: MEDICARE

## 2023-03-28 VITALS
BODY MASS INDEX: 44.44 KG/M2 | RESPIRATION RATE: 16 BRPM | OXYGEN SATURATION: 96 % | DIASTOLIC BLOOD PRESSURE: 86 MMHG | WEIGHT: 206 LBS | HEIGHT: 57 IN | SYSTOLIC BLOOD PRESSURE: 138 MMHG | HEART RATE: 90 BPM

## 2023-03-28 DIAGNOSIS — R05.3 CHRONIC COUGH: Primary | ICD-10-CM

## 2023-03-28 PROCEDURE — 99214 OFFICE O/P EST MOD 30 MIN: CPT | Performed by: INTERNAL MEDICINE

## 2023-03-28 RX ORDER — FLUTICASONE PROPIONATE AND SALMETEROL 250; 50 UG/1; UG/1
1 POWDER RESPIRATORY (INHALATION) 2 TIMES DAILY
Qty: 1 EACH | Refills: 5 | Status: SHIPPED | OUTPATIENT
Start: 2023-03-28

## 2023-03-28 NOTE — PATIENT INSTRUCTIONS
Plan:  -ok to stop Breo - use rescue as needed - resume while traveling   - I will print  RX for advair-  - to get PFTS - prior to next visit   - see me in 6 months     Brooklynn Page MD  Pulmonary Medicine  03/28/23

## 2023-05-05 ENCOUNTER — OFFICE VISIT (OUTPATIENT)
Facility: CLINIC | Age: 73
End: 2023-05-05
Payer: MEDICARE

## 2023-05-05 VITALS
WEIGHT: 205 LBS | DIASTOLIC BLOOD PRESSURE: 90 MMHG | RESPIRATION RATE: 16 BRPM | OXYGEN SATURATION: 97 % | HEART RATE: 67 BPM | HEIGHT: 57 IN | BODY MASS INDEX: 44.23 KG/M2 | SYSTOLIC BLOOD PRESSURE: 148 MMHG

## 2023-05-05 DIAGNOSIS — R05.3 CHRONIC COUGH: Primary | ICD-10-CM

## 2023-05-05 PROCEDURE — 99214 OFFICE O/P EST MOD 30 MIN: CPT | Performed by: INTERNAL MEDICINE

## 2023-05-05 RX ORDER — CARVEDILOL 3.12 MG/1
3.12 TABLET ORAL 2 TIMES DAILY WITH MEALS
COMMUNITY
Start: 2023-05-01

## 2023-05-05 RX ORDER — FLUTICASONE PROPIONATE AND SALMETEROL 500; 50 UG/1; UG/1
1 POWDER RESPIRATORY (INHALATION) 2 TIMES DAILY
Qty: 1 EACH | Refills: 3 | Status: SHIPPED | OUTPATIENT
Start: 2023-05-05 | End: 2023-06-04

## 2023-05-05 NOTE — PATIENT INSTRUCTIONS
Plan:  - put PFTs on hold for 2 months or so - until you feel well   - consider taking PEPCID as needed for breakthrough GERD -- continue nexium   - to stop advair 250 and begin advair 500- one puff twice a day - rinse and spit   - increase flonase to one puff each nostril twice a day -   - see me in 6 months     Nichelle Tena MD  Pulmonary Medicine  03/28/23

## 2023-05-17 ENCOUNTER — TELEPHONE (OUTPATIENT)
Facility: CLINIC | Age: 73
End: 2023-05-17

## 2023-05-24 ENCOUNTER — LAB ENCOUNTER (OUTPATIENT)
Dept: LAB | Age: 73
End: 2023-05-24
Attending: INTERNAL MEDICINE
Payer: MEDICARE

## 2023-05-24 DIAGNOSIS — B99.8 IMMUNOSUPPRESSION-RELATED INFECTIOUS DISEASE (HCC): ICD-10-CM

## 2023-05-24 DIAGNOSIS — M05.79 SEROPOSITIVE RHEUMATOID ARTHRITIS OF MULTIPLE SITES (HCC): Primary | ICD-10-CM

## 2023-05-24 DIAGNOSIS — M15.9 GENERALIZED OSTEOARTHROSIS, INVOLVING MULTIPLE SITES: ICD-10-CM

## 2023-05-24 DIAGNOSIS — D84.9 IMMUNOSUPPRESSION-RELATED INFECTIOUS DISEASE (HCC): ICD-10-CM

## 2023-05-24 DIAGNOSIS — Z51.81 ENCOUNTER FOR THERAPEUTIC DRUG MONITORING: ICD-10-CM

## 2023-05-24 DIAGNOSIS — G47.30 SLEEP APNEA: ICD-10-CM

## 2023-05-24 DIAGNOSIS — I15.9 SECONDARY HYPERTENSION: ICD-10-CM

## 2023-05-24 LAB
ALBUMIN SERPL-MCNC: 3.9 G/DL (ref 3.4–5)
ALBUMIN/GLOB SERPL: 1.2 {RATIO} (ref 1–2)
ALP LIVER SERPL-CCNC: 48 U/L
ALT SERPL-CCNC: 33 U/L
ANION GAP SERPL CALC-SCNC: 4 MMOL/L (ref 0–18)
AST SERPL-CCNC: 15 U/L (ref 15–37)
BASOPHILS # BLD AUTO: 0.06 X10(3) UL (ref 0–0.2)
BASOPHILS NFR BLD AUTO: 0.4 %
BILIRUB SERPL-MCNC: 0.5 MG/DL (ref 0.1–2)
BUN BLD-MCNC: 10 MG/DL (ref 7–18)
CALCIUM BLD-MCNC: 9.1 MG/DL (ref 8.5–10.1)
CHLORIDE SERPL-SCNC: 102 MMOL/L (ref 98–112)
CO2 SERPL-SCNC: 31 MMOL/L (ref 21–32)
CREAT BLD-MCNC: 0.62 MG/DL
CRP SERPL-MCNC: 1.07 MG/DL (ref ?–0.3)
EOSINOPHIL # BLD AUTO: 0.11 X10(3) UL (ref 0–0.7)
EOSINOPHIL NFR BLD AUTO: 0.8 %
ERYTHROCYTE [DISTWIDTH] IN BLOOD BY AUTOMATED COUNT: 13.3 %
ERYTHROCYTE [SEDIMENTATION RATE] IN BLOOD: 28 MM/HR
FASTING STATUS PATIENT QL REPORTED: YES
GFR SERPLBLD BASED ON 1.73 SQ M-ARVRAT: 95 ML/MIN/1.73M2 (ref 60–?)
GLOBULIN PLAS-MCNC: 3.3 G/DL (ref 2.8–4.4)
GLUCOSE BLD-MCNC: 112 MG/DL (ref 70–99)
HCT VFR BLD AUTO: 44.5 %
HGB BLD-MCNC: 14.5 G/DL
IMM GRANULOCYTES # BLD AUTO: 0.05 X10(3) UL (ref 0–1)
IMM GRANULOCYTES NFR BLD: 0.4 %
LYMPHOCYTES # BLD AUTO: 2.9 X10(3) UL (ref 1–4)
LYMPHOCYTES NFR BLD AUTO: 21.5 %
MCH RBC QN AUTO: 31.6 PG (ref 26–34)
MCHC RBC AUTO-ENTMCNC: 32.6 G/DL (ref 31–37)
MCV RBC AUTO: 96.9 FL
MONOCYTES # BLD AUTO: 0.77 X10(3) UL (ref 0.1–1)
MONOCYTES NFR BLD AUTO: 5.7 %
NEUTROPHILS # BLD AUTO: 9.58 X10 (3) UL (ref 1.5–7.7)
NEUTROPHILS # BLD AUTO: 9.58 X10(3) UL (ref 1.5–7.7)
NEUTROPHILS NFR BLD AUTO: 71.2 %
OSMOLALITY SERPL CALC.SUM OF ELEC: 284 MOSM/KG (ref 275–295)
PLATELET # BLD AUTO: 330 10(3)UL (ref 150–450)
POTASSIUM SERPL-SCNC: 3.9 MMOL/L (ref 3.5–5.1)
PROT SERPL-MCNC: 7.2 G/DL (ref 6.4–8.2)
RBC # BLD AUTO: 4.59 X10(6)UL
SODIUM SERPL-SCNC: 137 MMOL/L (ref 136–145)
WBC # BLD AUTO: 13.5 X10(3) UL (ref 4–11)

## 2023-05-24 PROCEDURE — 85025 COMPLETE CBC W/AUTO DIFF WBC: CPT

## 2023-05-24 PROCEDURE — 80053 COMPREHEN METABOLIC PANEL: CPT

## 2023-05-24 PROCEDURE — 86140 C-REACTIVE PROTEIN: CPT

## 2023-05-24 PROCEDURE — 85652 RBC SED RATE AUTOMATED: CPT

## 2023-06-07 ENCOUNTER — OFFICE VISIT (OUTPATIENT)
Facility: CLINIC | Age: 73
End: 2023-06-07
Payer: MEDICARE

## 2023-06-07 ENCOUNTER — TELEPHONE (OUTPATIENT)
Facility: CLINIC | Age: 73
End: 2023-06-07

## 2023-06-07 VITALS
HEIGHT: 57 IN | RESPIRATION RATE: 16 BRPM | SYSTOLIC BLOOD PRESSURE: 130 MMHG | OXYGEN SATURATION: 98 % | DIASTOLIC BLOOD PRESSURE: 88 MMHG | WEIGHT: 206 LBS | HEART RATE: 78 BPM | BODY MASS INDEX: 44.44 KG/M2

## 2023-06-07 DIAGNOSIS — G47.33 OSA (OBSTRUCTIVE SLEEP APNEA): Primary | ICD-10-CM

## 2023-06-07 DIAGNOSIS — R09.02 HYPOXEMIA: Primary | ICD-10-CM

## 2023-06-07 DIAGNOSIS — F51.01 PRIMARY INSOMNIA: ICD-10-CM

## 2023-06-07 DIAGNOSIS — G47.33 OBSTRUCTIVE SLEEP APNEA: Primary | ICD-10-CM

## 2023-06-07 PROCEDURE — 99214 OFFICE O/P EST MOD 30 MIN: CPT | Performed by: OTHER

## 2023-06-07 NOTE — TELEPHONE ENCOUNTER
Mario Dc DO  P [facility] Pulmonary Sleep Staff  Please send orders to discontinue O2

## 2023-06-30 ENCOUNTER — TELEPHONE (OUTPATIENT)
Facility: CLINIC | Age: 73
End: 2023-06-30

## 2023-09-26 ENCOUNTER — ORDER TRANSCRIPTION (OUTPATIENT)
Dept: ADMINISTRATIVE | Facility: HOSPITAL | Age: 73
End: 2023-09-26

## 2023-09-26 DIAGNOSIS — R05.3 CHRONIC COUGH: Primary | ICD-10-CM

## 2023-10-10 ENCOUNTER — OFFICE VISIT (OUTPATIENT)
Facility: CLINIC | Age: 73
End: 2023-10-10
Payer: MEDICARE

## 2023-10-10 VITALS
RESPIRATION RATE: 18 BRPM | BODY MASS INDEX: 44.23 KG/M2 | HEIGHT: 57 IN | SYSTOLIC BLOOD PRESSURE: 140 MMHG | OXYGEN SATURATION: 95 % | WEIGHT: 205 LBS | HEART RATE: 92 BPM | DIASTOLIC BLOOD PRESSURE: 80 MMHG

## 2023-10-10 DIAGNOSIS — F51.01 PRIMARY INSOMNIA: ICD-10-CM

## 2023-10-10 DIAGNOSIS — G47.33 OBSTRUCTIVE SLEEP APNEA: Primary | ICD-10-CM

## 2023-10-10 PROCEDURE — 99214 OFFICE O/P EST MOD 30 MIN: CPT | Performed by: OTHER

## 2023-10-10 RX ORDER — CARVEDILOL 12.5 MG/1
25 TABLET ORAL 2 TIMES DAILY WITH MEALS
COMMUNITY
Start: 2023-09-15

## 2023-10-10 RX ORDER — FLUTICASONE PROPIONATE AND SALMETEROL 50; 500 UG/1; UG/1
POWDER RESPIRATORY (INHALATION)
COMMUNITY
Start: 2023-10-07

## 2023-10-10 RX ORDER — ZOLPIDEM TARTRATE 5 MG/1
10 TABLET ORAL NIGHTLY PRN
Qty: 30 TABLET | Refills: 5 | Status: SHIPPED | OUTPATIENT
Start: 2023-10-10

## 2023-10-16 ENCOUNTER — RT VISIT (OUTPATIENT)
Dept: RESPIRATORY THERAPY | Facility: HOSPITAL | Age: 73
End: 2023-10-16
Attending: INTERNAL MEDICINE
Payer: MEDICARE

## 2023-10-16 DIAGNOSIS — R05.3 CHRONIC COUGH: ICD-10-CM

## 2023-10-16 PROCEDURE — 94726 PLETHYSMOGRAPHY LUNG VOLUMES: CPT | Performed by: INTERNAL MEDICINE

## 2023-10-16 PROCEDURE — 94010 BREATHING CAPACITY TEST: CPT | Performed by: INTERNAL MEDICINE

## 2023-10-16 PROCEDURE — 94729 DIFFUSING CAPACITY: CPT | Performed by: INTERNAL MEDICINE

## 2023-10-18 NOTE — PROCEDURES
Findings:  FEV1 is 1.78L, 101% predicted. FVC is 2.22L, 99% predicted. FEV1/ FVC ratio is 0.80. The flow-volume loop demonstrates a normal pattern. The TLC is 4.33L, 110% predicted. The residual volume 2.11L, 117% predicted. The diffusion capacity is 81% predicted and 87% predicted when corrected for alveolar volume. Impression:  There is no airway obstruction on spirometry and visualized on flow-volume loop. Lung volumes are normal.  Diffusion capacity is normal.  When compared to previous pulmonary function testing dated 1/21/2012, there has been significant INCREASES in diffusion capacity (previously DLCO 52% predicted). Family member

## 2023-11-27 NOTE — PROGRESS NOTES
Hilaria Jordan  2023 - 2023  Patient ID: [id number]  : 1950  Age: 71 years  Gender: Female  [address], [address]  Phone: [phone]  Fax: [phone]  Email: [email or web address]  Compliance Report  Compliance  Payor Standard  Usage 2023 - 2023  Usage days 90/90 days (100%)  >= 4 hours 82 days (91%)  < 4 hours 8 days (9%)  Usage hours 555 hours 24 minutes  Average usage (total days) 6 hours 10 minutes  Average usage (days used) 6 hours 10 minutes  Median usage (days used) 5 hours 54 minutes  Total used hours (value since last reset - 2023) 1,440 hours  AirSense 11 AutoSet  Serial number [id number]  Mode AutoSet  Min Pressure 10 cmH2O  Max Pressure 20 cmH2O  EPR Fulltime  EPR level 2  Response Standard  Therapy  Pressure - cmH2O Median: 11.9 95th percentile: 15.4 Maximum: 16.6  Leaks - L/min Median: 0.2 95th percentile: 11.5 Maximum: 20.8  Events per hour AI: 0.4 HI: 0.1 AHI: 0.5  Apnea Index Central: 0.2 Obstructive: 0.2 Unknown: 0.0  RERA Index 0.3  Cheyne-Valero respiration (average duration per night) 0 minutes (0%

## 2023-11-28 ENCOUNTER — OFFICE VISIT (OUTPATIENT)
Facility: CLINIC | Age: 73
End: 2023-11-28
Payer: MEDICARE

## 2023-11-28 VITALS
SYSTOLIC BLOOD PRESSURE: 136 MMHG | OXYGEN SATURATION: 97 % | WEIGHT: 199 LBS | DIASTOLIC BLOOD PRESSURE: 82 MMHG | RESPIRATION RATE: 14 BRPM | HEIGHT: 57 IN | HEART RATE: 72 BPM | BODY MASS INDEX: 42.93 KG/M2

## 2023-11-28 DIAGNOSIS — F51.01 PRIMARY INSOMNIA: ICD-10-CM

## 2023-11-28 DIAGNOSIS — G47.33 OBSTRUCTIVE SLEEP APNEA: Primary | ICD-10-CM

## 2023-11-28 PROCEDURE — 99214 OFFICE O/P EST MOD 30 MIN: CPT | Performed by: OTHER

## 2023-12-28 ENCOUNTER — OFFICE VISIT (OUTPATIENT)
Facility: CLINIC | Age: 73
End: 2023-12-28
Payer: MEDICARE

## 2023-12-28 VITALS
WEIGHT: 196 LBS | DIASTOLIC BLOOD PRESSURE: 78 MMHG | BODY MASS INDEX: 42.28 KG/M2 | OXYGEN SATURATION: 99 % | HEIGHT: 57 IN | HEART RATE: 77 BPM | RESPIRATION RATE: 16 BRPM | SYSTOLIC BLOOD PRESSURE: 128 MMHG

## 2023-12-28 DIAGNOSIS — G47.33 OBSTRUCTIVE SLEEP APNEA: Primary | ICD-10-CM

## 2023-12-28 DIAGNOSIS — R05.3 CHRONIC COUGH: ICD-10-CM

## 2023-12-28 PROCEDURE — 99214 OFFICE O/P EST MOD 30 MIN: CPT | Performed by: INTERNAL MEDICINE

## 2023-12-28 RX ORDER — ROSUVASTATIN CALCIUM 10 MG/1
10 TABLET, COATED ORAL NIGHTLY
COMMUNITY
Start: 2023-07-12 | End: 2024-07-06

## 2023-12-28 NOTE — PATIENT INSTRUCTIONS
Plan:  -consider resuming advair at first sign of illness , traveling , or if you feel like asthma - coughing etc                 - continue nexium -   - continue  flonase  one -two puff each nostril every night   - see me in 6 months     Irene Paez MD  Pulmonary Medicine  [phone]

## 2024-02-21 ENCOUNTER — MED REC SCAN ONLY (OUTPATIENT)
Facility: CLINIC | Age: 74
End: 2024-02-21

## 2024-03-29 NOTE — PROGRESS NOTES
[facility]  SLEEP PROGRESS NOTE        HPI:   This is a 73 year old female coming in for   Chief Complaint   Patient presents with    Obstructive Sleep Apnea (ALEXIA)     Dme: eliane  Mask: ffm large       HPI:   This is a 73 year old female who presents with the following symptoms, risk factors, behaviors or other items associated with sleep problems.    Sleep Apnea:   snoring; restless sleep; overweight; high blood pressure; diabetes; current CPAP use  Insomnia:  difficulty staying asleep; waking too early; not getting enough sleep; mind racing  Restless Leg:  urge to move legs when trying to sleep  Parasomnias:   teeth grinding  Daytime Problems:  sleepiness; napping on purpose    The patient's Addison Sleepiness score is 8/24.    Shea Mixon  2023 - 2024  Patient ID: [id number]  : 1950  Age: 73 years  Gender: Female  [place] [id number]  [facility], [address]  Phone: [phone]  Fax: [phone]  Email: [email or web address]  Compliance Report  Compliance  Payor Standard  Usage 2023 - 2024  Usage days 90/90 days (100%)  >= 4 hours 79 days (88%)  < 4 hours 11 days (12%)  Usage hours 543 hours 1 minutes  Average usage (total days) 6 hours 2 minutes  Average usage (days used) 6 hours 2 minutes  Median usage (days used) 6 hours 1 minutes  Total used hours (value since last reset - 2024) 2,168 hours  AirSense 11 AutoSet  Serial number [id number]  Mode AutoSet  Min Pressure 10 cmH2O  Max Pressure 20 cmH2O  EPR Fulltime  EPR level 2  Response Standard  Therapy  Pressure - cmH2O Median: 12.2 95th percentile: 15.8 Maximum: 17.2  Leaks - L/min Median: 0.2 95th percentile: 10.1 Maximum: 19.3  Events per hour AI: 0.3 HI: 0.1 AHI: 0.4  Apnea Index Central: 0.1 Obstructive: 0.2 Unknown: 0.0  RERA Index 0.4  Cheyne-Valero respiration (average duration per night) 0 minutes (0%)    Going to bed around 9-10 wakes at 3am, up for the day, takes a nap around 3pm for 45  min  According to , she has come to peace with using device  She feels she is doing better  She takes zolpidem 5mg intermittently, without much effect, only sleeps one hour with it    Patient: Sleep review of systems today: see form.      Pt  PCP:  Celena Feliciano MD  No referring provider defined for this encounter.           No data to display                    Past Medical History:   Diagnosis Date    Depression     Diabetes (HCC)     Diabetes type I (HCC)     Essential hypertension     Hearing loss 2019    wears a hearing aid    Hypertension     Migraine     Obesity     Other and unspecified hyperlipidemia     Reactive airway disease (HCC)     Pulmonary    Rheumatoid arthritis (HCC)     Dr Smith    Sleep apnea      Past Surgical History:   Procedure Laterality Date    CARPAL TUNNEL RELEASE      [place]    KNEE REPLACEMENT SURGERY  2014    right          TONSILLECTOMY       Social History:  Social History     Social History Narrative    Not on file     Social History     Socioeconomic History    Marital status:    Tobacco Use    Smoking status: Never    Smokeless tobacco: Never   Vaping Use    Vaping Use: Never used   Substance and Sexual Activity    Alcohol use: Not Currently    Drug use: Never     Family History:  Family History   Problem Relation Age of Onset    Diabetes Father     Heart Disorder Father     Lipids Mother     Cancer Mother     Cancer Maternal Grandfather     Cancer Brother 65        prostate cancer      Allergies:  No Known Allergies  Current Meds:  Current Outpatient Medications   Medication Sig Dispense Refill    Trospium Chloride 20 MG Oral Tab Take 1 tablet (20 mg total) by mouth 2 (two) times daily.      zolpidem 5 MG Oral Tab Take 1 tablet (5 mg total) by mouth nightly as needed for Sleep. 30 tablet 5    rosuvastatin 10 MG Oral Tab Take 1 tablet (10 mg total) by mouth nightly.      carvedilol 12.5 MG Oral Tab Take 2 tablets (25 mg total) by mouth 2 (two)  times daily with meals.      zolpidem 5 MG Oral Tab Take 2 tablets (10 mg total) by mouth nightly as needed for Sleep. 30 tablet 5    SUMATRIPTAN SUCCINATE 100 MG Oral Tab TAKE 1 TABLET BY MOUTH EVERY 2 HOURS AS NEEDED FOR MIGRAINE. 12 tablet 5    AIMOVIG 140 MG/ML Subcutaneous Solution Auto-injector INJECT 1 ML (140 MG TOTAL) INTO THE SKIN EVERY 30 (THIRTY) DAYS. THIS IS A 90 DAY PRESCRIPTION 3 mL 2    DIVALPROEX 250 MG Oral Tablet 24 Hr TAKE 4 TABLETS (1000MG) BY MOUTH DAILY 360 tablet 1    naproxen 500 MG Oral Tab Take 1 tablet (500 mg total) by mouth 2 (two) times daily as needed. 30 tablet 0    Pramipexole Dihydrochloride 0.25 MG Oral Tab Take 2 tablets (0.5 mg total) by mouth nightly. 180 tablet 3    OneTouch Delica Lancets 33G Does not apply Misc Use to test glucose once daily as directed 100 each 0    Glucose Blood (ONETOUCH ULTRA) In Vitro Strip Use to test glucose once daily as directed 100 each 0    traMADol HCl 50 MG Oral Tab tramadol 50 mg tablet      folic acid 1 MG Oral Tab Take 1 tablet (1 mg total) by mouth in the morning and 1 tablet (1 mg total) before bedtime.      Probiotic Product (PROBIOTIC-10) Oral Chew Tab Chew by mouth.      Multiple Vitamins-Minerals (MULTIVITAMIN ADULT EXTRA C OR) multivitamin      Ascorbic Acid (VITAMIN C) 500 MG Oral Cap       Diclofenac Sodium 1 % Transdermal Gel Apply 2 g topically.      Esomeprazole Magnesium 20 MG Oral Powd Pack Take by mouth.      Glucosamine Sulfate 500 MG Oral Tab Glucosamine 500 mg tablet   Take by oral route.      ONETOUCH LANCETS Does not apply Misc Test accucheck daily  each 3    Glucose Blood (ONETOUCH ULTRA BLUE) In Vitro Strip Use to test glucose at home daily prn 100 strip 3    methotrexate 2.5 MG Oral Tab Take 1 tablet (2.5 mg total) by mouth every 7 days. 4 tabs weekly      ACCU-CHEK SHADE PLUS In Vitro Strip       Accu-Chek Multiclix Lancets Does not apply Misc       ADVAIR DISKUS 500-50 MCG/ACT Inhalation Aerosol Powder, Breath  Activated every 28 days. FOR 21 DAYS IN, THEN REMOVE FOR 7 DAYS (Patient not taking: Reported on 4/2/2024)      ALBUTEROL 108 (90 Base) MCG/ACT Inhalation Aero Soln INHALE 2 PUFFS BY MOUTH EVERY 4-6 HOURS AS NEEDED (Patient not taking: Reported on 4/2/2024) 8.5 each 6    cholecalciferol 1000 UNITS Oral Cap 1 capsule (1,000 Units total). (Patient not taking: Reported on 4/2/2024)        Counseling given: Not Answered         Problem List:  Patient Active Problem List   Diagnosis    Tenosynovitis of foot and ankle    Closed fracture of unspecified bone(s) of foot (except toes)    Sprain of foot, unspecified site    Strain of foot, right, initial encounter    Intractable migraine without aura and without status migrainosus    RLS (restless legs syndrome)    Asthma (HCC)    Depressive disorder    Gastroesophageal reflux disease    Generalized osteoarthritis    Hyperlipidemia    Inflammatory polyarthropathy ([facility])    Pure hypercholesterolemia    Essential hypertension    Menopausal symptom    Migraine    Tendinopathy of left rotator cuff    Subacromial impingement of left shoulder    Biceps tendinopathy, left    Rheumatoid arthritis ([facility])    Osteoarthritis of left acromioclavicular joint    Recurrent major depressive disorder, in full remission ([facility])    Controlled type 2 diabetes mellitus without complication, without long-term current use of insulin ([facility])    Obstructive sleep apnea    Hypoxemia associated with sleep    Primary insomnia    Class 2 obesity due to excess calories without serious comorbidity in adult    Chronic cough       REVIEW OF SYSTEMS:   Review of Systems    EXAM:   /78 (BP Location: Right arm, Patient Position: Sitting, Cuff Size: adult)   Pulse 86   Resp 16   Ht 4' 9\" (1.448 m)   Wt 197 lb (89.4 kg)   SpO2 95%   BMI 42.63 kg/m²  Estimated body mass index is 42.63 kg/m² as calculated from the following:    Height as of this encounter: 4' 9\" (1.448 m).    Weight as of this encounter: 197 lb  (89.4 kg).   Neck in inches:      Wt Readings from Last 6 Encounters:   04/02/24 197 lb (89.4 kg)   12/28/23 196 lb (88.9 kg)   11/28/23 199 lb (90.3 kg)   10/10/23 205 lb (93 kg)   06/07/23 206 lb (93.4 kg)   05/05/23 205 lb (93 kg)     BP Readings from Last 3 Encounters:   04/02/24 128/78   12/28/23 128/78   11/28/23 136/82     Pulse Readings from Last 3 Encounters:   04/02/24 86   12/28/23 77   11/28/23 72     SpO2 Readings from Last 3 Encounters:   04/02/24 95%   12/28/23 99%   11/28/23 97%      Patient must be at least 60 in tall to calculate ideal body weight    Vital signs reviewed.  Physical Exam    ASSESSMENT AND PLAN:   1. Primary insomnia  - zolpidem 5 MG Oral Tab; Take 1 tablet (5 mg total) by mouth nightly as needed for Sleep.  Dispense: 30 tablet; Refill: 5  Will take 1-1.5 tabs as needed  Otherwise plan discussed  Delay sleep time to 10 if possible  Now goes to bed at 9pm, wakes around 2-3am, usually not able to get back to sleep  Takes 45min around 3pm which does not delay sleep onset later  Put timer on TV for 30 min if must have TV on    2. Obstructive sleep apnea  Treatment options were discussed.  Positive airway pressure therapy is the gold standard.  Other options include mandibular advancement devices, evaluation of the upper airway by ear nose throat specialist, inspire therapy, and weight loss to be used in conjunction.   3. Hypoxemia associated with sleep    There are no Patient Instructions on file for this visit.    Independent interpretation of Sleep Download as defined above.  Continue with Rx management of Sleep apnea with PAP therapy.    COMPLIANCE is required by insurance for 4 hours a night most nights of the week.    Advised if still with sleep apnea and not using CPAP has a 7 fold increase in risk of heart attack, stroke, abnormal heart rhythm  and death,  increased risk of driving accidents.     Advised to refrain from driving when sleepy.      Recommend weight loss, and maintain  and optimal BMI with Exercise 30 minutes most days to target heart rate .     Advised patient to change filters,masks,hoses  and tubes and equiptment on a  regular schedule.    Filters and seals shall be changed every 1 month,  Hoses every 3 months,   CPAP mask and humidifier chamber changed every 6 month  with the durable medical equipment provider.         Meds & Refills for this Visit:  Requested Prescriptions     Signed Prescriptions Disp Refills    zolpidem 5 MG Oral Tab 30 tablet 5     Sig: Take 1 tablet (5 mg total) by mouth nightly as needed for Sleep.       Outcome: Parent verbalizes understanding. Parent is notified to call with any questions, complications, allergies, or worsening or changing symptoms.  Parent is to call with any side effects or complications from the treatments as a result of today.     \" This note was created utilizing Dragon speech recognition software.  Please excuse any grammatical errors. Call my office if you have any questions regarding this note. \"     Mario Dc,   4/2/2024  9:13 AM

## 2024-04-02 ENCOUNTER — OFFICE VISIT (OUTPATIENT)
Facility: CLINIC | Age: 74
End: 2024-04-02
Payer: MEDICARE

## 2024-04-02 VITALS
OXYGEN SATURATION: 95 % | HEART RATE: 86 BPM | HEIGHT: 57 IN | SYSTOLIC BLOOD PRESSURE: 128 MMHG | RESPIRATION RATE: 16 BRPM | WEIGHT: 197 LBS | BODY MASS INDEX: 42.5 KG/M2 | DIASTOLIC BLOOD PRESSURE: 78 MMHG

## 2024-04-02 DIAGNOSIS — G47.36 HYPOXEMIA ASSOCIATED WITH SLEEP: ICD-10-CM

## 2024-04-02 DIAGNOSIS — G47.33 OBSTRUCTIVE SLEEP APNEA: Primary | ICD-10-CM

## 2024-04-02 DIAGNOSIS — F51.01 PRIMARY INSOMNIA: ICD-10-CM

## 2024-04-02 PROCEDURE — 99214 OFFICE O/P EST MOD 30 MIN: CPT | Performed by: OTHER

## 2024-04-02 RX ORDER — TROSPIUM CHLORIDE 20 MG/1
20 TABLET, FILM COATED ORAL 2 TIMES DAILY
COMMUNITY
Start: 2024-02-15 | End: 2025-02-14

## 2024-04-02 RX ORDER — ZOLPIDEM TARTRATE 5 MG/1
5 TABLET ORAL NIGHTLY PRN
Qty: 30 TABLET | Refills: 5 | Status: SHIPPED | OUTPATIENT
Start: 2024-04-02

## 2024-06-25 ENCOUNTER — OFFICE VISIT (OUTPATIENT)
Facility: CLINIC | Age: 74
End: 2024-06-25

## 2024-06-25 VITALS
OXYGEN SATURATION: 98 % | WEIGHT: 199 LBS | SYSTOLIC BLOOD PRESSURE: 128 MMHG | HEART RATE: 82 BPM | DIASTOLIC BLOOD PRESSURE: 86 MMHG | RESPIRATION RATE: 18 BRPM | BODY MASS INDEX: 43 KG/M2

## 2024-06-25 DIAGNOSIS — G47.33 OBSTRUCTIVE SLEEP APNEA: Primary | ICD-10-CM

## 2024-06-25 DIAGNOSIS — R05.3 CHRONIC COUGH: ICD-10-CM

## 2024-06-25 PROCEDURE — 99214 OFFICE O/P EST MOD 30 MIN: CPT | Performed by: INTERNAL MEDICINE

## 2024-06-25 NOTE — PROGRESS NOTES
Pulmonary Progress Note    History of Present Illness:  Shea Mixon is a 73 year old female presenting to pulmonary clinic -  Traveling in jan   Coughs at night - snorts and snoring- and awakening   Sleep study 1/29 plans for repeat with no oxygen   Using flonase and saline -   Using wedge a very high one -   Ok to fall asleep - then awakens -   Remains with cough   No dyspnea     Javier giraldo for injection pain - - back - short lived relief   Much better now- - and recently saw ortho - dr nayak - kerry- left knee - had xrays   Ongoing pain - had rt replacement   No dyspnea   Remains on flonase and saline   Cough is much better -- some coughing at night - per  -   To get new machine - today - - dubeck-     Has been sick-- for 3 weeks told bronchitis   Had neg cxr   Neg covid   More coughing - at night now - nebulizer 4 times a day and prednsione   Started on Adavir twice a day in place of Breo   No cp  No fever - cough -   No med changes   Wearing machine -every night - -still with insomnia and still awakening at 3am - and 1-2 hours to go back to sleep   - not rested- naps during the day -     12.23   Doing well - using machine - remains on machine though with some insomnia-- better since sleeping separately   Cough is much better-- some at night and less with machine - occasionally  coughs   No recent illness and no dyspnea   Last rescue use in [place] - [place] cruise - got sick vomiting   Seeing cardio with HTN-   No new issues with dr munoz - methotrexate - 4 tabs-   High WBC related to steroid injection - to recheck   Remained on advair daily with traveling-     6/24- doing well now--had stopped PPI  few weeks ago cough resumed- and then had a cold few months ago-saw primary - and recommended PPI - and helped cough- then stopped and cough recurred - now on twice a day -   Resumed advair while traveling -- then stopped-   No rescue inhaler at all -   No dyspnea - no limiation though no exercise - climbs stairs  and walks the dog   Increased RA-- on tramodaol - one a day at most and methotrexate 4 tabs a week                     Social History:   Social History     Socioeconomic History    Marital status:    Tobacco Use    Smoking status: Never    Smokeless tobacco: Never   Vaping Use    Vaping status: Never Used   Substance and Sexual Activity    Alcohol use: Not Currently    Drug use: Never        Medications:   Current Outpatient Medications   Medication Sig Dispense Refill    metFORMIN HCl 1000 MG Oral Tab Take 1 tablet (1,000 mg total) by mouth 2 (two) times daily with meals.      Trospium Chloride 20 MG Oral Tab Take 1 tablet (20 mg total) by mouth 2 (two) times daily.      rosuvastatin 10 MG Oral Tab Take 1 tablet (10 mg total) by mouth nightly.      carvedilol 12.5 MG Oral Tab Take 2 tablets (25 mg total) by mouth 2 (two) times daily with meals.      zolpidem 5 MG Oral Tab Take 2 tablets (10 mg total) by mouth nightly as needed for Sleep. 30 tablet 5    SUMATRIPTAN SUCCINATE 100 MG Oral Tab TAKE 1 TABLET BY MOUTH EVERY 2 HOURS AS NEEDED FOR MIGRAINE. 12 tablet 5    AIMOVIG 140 MG/ML Subcutaneous Solution Auto-injector INJECT 1 ML (140 MG TOTAL) INTO THE SKIN EVERY 30 (THIRTY) DAYS. THIS IS A 90 DAY PRESCRIPTION 3 mL 2    DIVALPROEX 250 MG Oral Tablet 24 Hr TAKE 4 TABLETS (1000MG) BY MOUTH DAILY 360 tablet 1    naproxen 500 MG Oral Tab Take 1 tablet (500 mg total) by mouth 2 (two) times daily as needed. 30 tablet 0    OneTouch Delica Lancets 33G Does not apply Misc Use to test glucose once daily as directed 100 each 0    Glucose Blood (ONETOUCH ULTRA) In Vitro Strip Use to test glucose once daily as directed 100 each 0    traMADol HCl 50 MG Oral Tab tramadol 50 mg tablet      folic acid 1 MG Oral Tab Take 1 tablet (1 mg total) by mouth in the morning and 1 tablet (1 mg total) before bedtime.      Probiotic Product (PROBIOTIC-10) Oral Chew Tab Chew by mouth.      Multiple Vitamins-Minerals (MULTIVITAMIN ADULT  EXTRA C OR) multivitamin      Ascorbic Acid (VITAMIN C) 500 MG Oral Cap       Diclofenac Sodium 1 % Transdermal Gel Apply 2 g topically.      Glucosamine Sulfate 500 MG Oral Tab Glucosamine 500 mg tablet   Take by oral route.      ONETOUCH LANCETS Does not apply Misc Test accucheck daily  each 3    Glucose Blood (ONETOUCH ULTRA BLUE) In Vitro Strip Use to test glucose at home daily prn 100 strip 3    methotrexate 2.5 MG Oral Tab Take 1 tablet (2.5 mg total) by mouth every 7 days. 4 tabs weekly      ACCU-CHEK SHADE PLUS In Vitro Strip       Accu-Chek Multiclix Lancets Does not apply Misc       zolpidem 5 MG Oral Tab Take 1 tablet (5 mg total) by mouth nightly as needed for Sleep. 30 tablet 5    ADVAIR DISKUS 500-50 MCG/ACT Inhalation Aerosol Powder, Breath Activated every 28 days. FOR 21 DAYS IN, THEN REMOVE FOR 7 DAYS (Patient not taking: Reported on 4/2/2024)      ALBUTEROL 108 (90 Base) MCG/ACT Inhalation Aero Soln INHALE 2 PUFFS BY MOUTH EVERY 4-6 HOURS AS NEEDED (Patient not taking: Reported on 4/2/2024) 8.5 each 6    Pramipexole Dihydrochloride 0.25 MG Oral Tab Take 2 tablets (0.5 mg total) by mouth nightly. 180 tablet 3    cholecalciferol 1000 UNITS Oral Cap 1 capsule (1,000 Units total). (Patient not taking: Reported on 4/2/2024)         Review of Systems:    Weight is up -- again-- increased metformin -- to consider ozempic   No gerd - remains on ppi daily - some coming and going and with increased cough at times that gets better with ppi   No dairrhea   No swelling   Joints good and bad days -- dr munoz - no changes   Sleeping with some difficulty -now with new machine  and using nightly with encouragement from her -continues with oxygen when not using- using daily now     Physical Exam:  /86   Pulse 82   Resp 18   Wt 199 lb (90.3 kg)   SpO2 98%   BMI 43.06 kg/m²    Constitutional: comfortable . No acute distress.  Sounds clear   HEENT: Head NC/AT. PEERL. Throat is clear -  Cardio:  Regular rate and rhythm. Normal S1 and S2.   Resp   rare crackle that clears - no wheeze   GI:  Abd soft, non-tender.obese   Extremities: No clubbing or cyanosis. No LE edema. No calf tenderness.  Neurologic: A&Ox3. No gross motor deficits.  Skin: Warm, dry.no rashes or hives noted   Lymphatic: No cervical or supraclavicular lymphadenopathy.no jvd   Psych: Calm, cooperative. Pleasant affect.    Results: reviewed     Assessment/Plan:  #Sleep disorder  No evidence of ALEXIA in 2008  continues to deny any apneic episodes  No evidence of progression of pulmonary hypertension continues on nocturnal O2  Increased snoring negative overnight in 2017 on O2 repeat overnight on 2 L with 2% of the time less than 90% to resume 2 L  5/21 increase noise with sleeping patient has an occasional awakening  11/21 increased awakening overnight with 4.6 events 7.4% with sats less than 90 reports not sleeping as well  8/22 worsening sleep disorder 1 set a.m.  12/22--underwent repeat sleep study with AHI 5.3 when supine 7.6 remy 81%  Agreeable to titration as patient is sleeping very poorly  3.23- titrated  and awaiting machine   5/23 reports using and benefiting though struggling at times-plans to follow-up with Dr. Dc concerning role of additional oxygen  12.23- doing very well on machine with AHI 0.5 -- also separate rooms - and less insomnia   6/24- doing well with ahi 0.4 -- needs to sleep more     #Rheumatoid arthritis seronegative  Remains on methotrexate though at weaning doses  Follows with Dr. Smith at [facility] group  Tolerating methotrexate wean--then required repeat increased dosing  3.23- decreasing -methotrexate dosing dropping   And stable   12.23- 4 pills and follows with dr munoz   6/24- remains on same meds-- methotrexate x 4- added tramadol       #GE reflux  Very stable on PPI insurance stopped paying  Tried to wean needs daily PPI notes breakthrough with H2 blocker only  11/19 with resurgence of  GERD and coughing to resume PPI 10/20 heartburn with coffee  8/22 denies  3/23 remains on daily PPI  5.23- was bad for a while - now better - - takes nexium -- with some breakthrough--to trial pepcid - as not suppose to take tums   12.23- occasionally  breakthrough - remains on daily PPI nexium   6/24- resurgance and now on BID-- - cough seemed to resurge with  gerd-when had stopped her PPI transient- now on BID       #Pulmonary hypertension  Had follow-up echo 2013 PAS went down from 40 to 29 mmHg  Evidence of diastolic dysfunction mild and improved with normal LVEF no recent issues remains on hydrochlorothiazide  12/22 notes increasing blood pressure--did not take HCTZ this a.m. now reports taking it faithfully--patient will call primary for need for increased medication  12/23 stable status  6/24- stable fluid status denies any symptoms      #Allergic rhinitis  Waxing and waning over the years  Improves with nasal steroids/Qnasl  Worse in the spring  8/22 resumed Flonase  12/22 ongoing cough  present management  3/23 thinks mostly controlled  12.23- controlled - remains on flonase daily   6/24- remains stable on flonase         #Cough/asthma     History of mild flares of bronchitis/airway obstruction in the past normal spirometry  Cough much improved with Breo used transiently  Clear chest x-ray no evidence of methotrexate issues  11/19 minimal resurgence but had stopped PPI nasal steroids and Breo-to resume serially  10/20 doing well denies cough-  8/22 remains controlled  12/22 increasing cough denies dyspnea-exam with mild rhonchi plans to resume Breo and follow  3.23 - - now clear - to stop ics/laba and follow   For pFTS   5/23 now with prolonged flare post presumed viral--seems predominantly upper airway-to complete PFTs on hold until clinically better  12.23- PFTS all nornal with DLCO increased from 52% to 81%   Using advair as needed and when sick and traveling   6/24- remains stable - - as needed and with  travel -       #Depression/anxiety  Remains on medication      -Plan:  -plan to resume advair at first sign of illness , traveling , or if you feel like asthma - coughing etc                 - continue nexium - twice a day   - continue  flonase  one or two puff each nostril every night   - consider walking in [employer] store at least 3 times a week OR MORE   - see me in 6 months     Samantha Munoz MD  Pulmonary Medicine  06/25/24

## 2024-06-25 NOTE — PATIENT INSTRUCTIONS
Plan:  -plan to resume advair at first sign of illness , traveling , or if you feel like asthma - coughing etc                 - continue nexium - twice a day - consider PEPCID AC in place of second nexium   - continue  flonase  one or two puff each nostril every night   - consider walking in [employer] store at least 3 times a week OR MORE   - see me in 6 months     Samantha Munoz MD  Pulmonary Medicine  06/25/24

## 2024-12-19 RX ORDER — LOSARTAN POTASSIUM AND HYDROCHLOROTHIAZIDE 12.5; 5 MG/1; MG/1
1 TABLET ORAL DAILY
COMMUNITY

## 2024-12-19 RX ORDER — ESOMEPRAZOLE MAGNESIUM 40 MG/1
40 CAPSULE, DELAYED RELEASE ORAL
COMMUNITY

## 2025-01-06 ENCOUNTER — LABORATORY ENCOUNTER (OUTPATIENT)
Dept: LAB | Facility: HOSPITAL | Age: 75
End: 2025-01-06
Attending: ORTHOPAEDIC SURGERY
Payer: MEDICARE

## 2025-01-06 DIAGNOSIS — Z01.818 PRE-OP TESTING: ICD-10-CM

## 2025-01-06 LAB
ANTIBODY SCREEN: NEGATIVE
RH BLOOD TYPE: POSITIVE

## 2025-01-06 PROCEDURE — 86850 RBC ANTIBODY SCREEN: CPT

## 2025-01-06 PROCEDURE — 86900 BLOOD TYPING SEROLOGIC ABO: CPT

## 2025-01-06 PROCEDURE — 86901 BLOOD TYPING SEROLOGIC RH(D): CPT

## 2025-01-08 NOTE — PROGRESS NOTES
Shea Mixon  10/10/2024 - 2025  Patient ID: [id number]  : 1950  Age: 74 years  Gender: Female  [place]  [id number]  [facility], [address]  Phone: [phone]  Fax: [phone]  Email: [email or web address]  Compliance Report  Compliance  Payor Standard  Usage 10/10/2024 - 2025  Usage days 90/90 days (100%)  >= 4 hours 72 days (80%)  < 4 hours 18 days (20%)  Usage hours 463 hours 11 minutes  Average usage (total days) 5 hours 9 minutes  Average usage (days used) 5 hours 9 minutes  Median usage (days used) 4 hours 59 minutes  Total used hours (value since last reset - 2025) 3,612 hours  AirSense 11 AutoSet  Serial number [id number]  Mode AutoSet  Min Pressure 10 cmH2O  Max Pressure 20 cmH2O  EPR Off  Response Standard  Therapy  Pressure - cmH2O Median: 11.9 95th percentile: 14.2 Maximum: 15.3  Leaks - L/min Median: 3.6 95th percentile: 28.2 Maximum: 36.8  Events per hour AI: 0.1 HI: 0.2 AHI: 0.3  Apnea Index Central: 0.1 Obstructive: 0.0 Unknown: 0.1  RERA Index 0.8  Cheyne-Valero respiration (average duration per night) 0 minutes (0%)

## 2025-01-09 ENCOUNTER — OFFICE VISIT (OUTPATIENT)
Facility: CLINIC | Age: 75
End: 2025-01-09
Payer: MEDICARE

## 2025-01-09 VITALS
RESPIRATION RATE: 16 BRPM | HEART RATE: 77 BPM | HEIGHT: 57 IN | BODY MASS INDEX: 39.91 KG/M2 | DIASTOLIC BLOOD PRESSURE: 70 MMHG | SYSTOLIC BLOOD PRESSURE: 128 MMHG | WEIGHT: 185 LBS | OXYGEN SATURATION: 96 %

## 2025-01-09 DIAGNOSIS — R05.3 CHRONIC COUGH: ICD-10-CM

## 2025-01-09 DIAGNOSIS — G47.33 OBSTRUCTIVE SLEEP APNEA: Primary | ICD-10-CM

## 2025-01-09 PROCEDURE — 99214 OFFICE O/P EST MOD 30 MIN: CPT | Performed by: INTERNAL MEDICINE

## 2025-01-09 RX ORDER — ROSUVASTATIN CALCIUM 10 MG/1
10 TABLET, COATED ORAL NIGHTLY
Status: ON HOLD | COMMUNITY

## 2025-01-09 RX ORDER — FLUTICASONE PROPIONATE AND SALMETEROL 500; 50 UG/1; UG/1
1 POWDER RESPIRATORY (INHALATION) 2 TIMES DAILY
Qty: 1 EACH | Refills: 5 | Status: ON HOLD | OUTPATIENT
Start: 2025-01-09 | End: 2025-02-08

## 2025-01-09 RX ORDER — MULTIVITAMIN
1 CAPSULE ORAL DAILY
Status: ON HOLD | COMMUNITY

## 2025-01-09 NOTE — PATIENT INSTRUCTIONS
-Plan:  -plan to resume advair at first sign of illness , traveling , or if you feel like asthma - coughing etc -  -- RESUME advair twice a day starting Saturday                 - OK to decrease nexium to every morning after surgery   - continue  flonase  one or two puff each nostril every night  - keep up good work with machine   - see me in 6 months     Samanthase Tammy MD  Pulmonary Medicine  1/9/25

## 2025-01-09 NOTE — PROGRESS NOTES
Pulmonary Progress Note    History of Present Illness:  Shea Mixon is a 74 year old female presenting to pulmonary clinic -  Traveling in jan   Coughs at night - snorts and snoring- and awakening   Sleep study 1/29 plans for repeat with no oxygen   Using flonase and saline -   Using wedge a very high one -   Ok to fall asleep - then awakens -   Remains with cough   No dyspnea     Javier giraldo for injection pain - - back - short lived relief   Much better now- - and recently saw ortho - dr nayak - kerry- left knee - had xrays   Ongoing pain - had rt replacement   No dyspnea   Remains on flonase and saline   Cough is much better -- some coughing at night - per  -   To get new machine - today - - dubeck-     Has been sick-- for 3 weeks told bronchitis   Had neg cxr   Neg covid   More coughing - at night now - nebulizer 4 times a day and prednsione   Started on Adavir twice a day in place of Breo   No cp  No fever - cough -   No med changes   Wearing machine -every night - -still with insomnia and still awakening at 3am - and 1-2 hours to go back to sleep   - not rested- naps during the day -     12.23   Doing well - using machine - remains on machine though with some insomnia-- better since sleeping separately   Cough is much better-- some at night and less with machine - occasionally  coughs   No recent illness and no dyspnea   Last rescue use [place] - [place] - got sick vomiting   Seeing cardio with HTN-   No new issues with dr munoz - methotrexate - 4 tabs-   High WBC related to steroid injection - to recheck   Remained on advair daily with traveling-     6/24- doing well now--had stopped PPI  few weeks ago cough resumed- and then had a cold few months ago-saw primary - and recommended PPI - and helped cough- then stopped and cough recurred - now on twice a day -   Resumed advair while traveling -- then stopped-   No rescue inhaler at all -   No dyspnea - no limiation though no exercise - climbs stairs  and walks the dog   Increased RA-- on tramodaol - one a day at most and methotrexate 4 tabs a week     1/25- doing OK - /well   Used albuterol once in past 6 months -   Resumed advair with travel - daily - then stopped - not needed -   For knee replacement - on Monday - - bone on bone - [place] - dr babcock   To see in am                     Social History:   Social History     Socioeconomic History    Marital status:    Tobacco Use    Smoking status: Never    Smokeless tobacco: Never   Vaping Use    Vaping status: Never Used   Substance and Sexual Activity    Alcohol use: Yes     Comment: SOCIALLY    Drug use: Never        Medications:   Current Outpatient Medications   Medication Sig Dispense Refill    Multiple Vitamin (MULTIVITAMINS) Oral Cap Take 1 capsule by mouth daily.      rosuvastatin 10 MG Oral Tab Take 1 tablet (10 mg total) by mouth nightly.      losartan-hydroCHLOROthiazide 50-12.5 MG Oral Tab Take 1 tablet by mouth daily.      Esomeprazole Magnesium 40 MG Oral Capsule Delayed Release Take 1 capsule (40 mg total) by mouth every morning before breakfast.      Magnesium Oxide (MAGNESIUM EXTRA STRENGTH OR) Take by mouth.      metFORMIN HCl 1000 MG Oral Tab Take 1 tablet (1,000 mg total) by mouth 2 (two) times daily with meals.      Trospium Chloride 20 MG Oral Tab Take 1 tablet (20 mg total) by mouth 2 (two) times daily.      carvedilol 12.5 MG Oral Tab Take 2 tablets (25 mg total) by mouth 2 (two) times daily with meals.      zolpidem 5 MG Oral Tab Take 2 tablets (10 mg total) by mouth nightly as needed for Sleep. 30 tablet 5    SUMATRIPTAN SUCCINATE 100 MG Oral Tab TAKE 1 TABLET BY MOUTH EVERY 2 HOURS AS NEEDED FOR MIGRAINE. 12 tablet 5    DIVALPROEX 250 MG Oral Tablet 24 Hr TAKE 4 TABLETS (1000MG) BY MOUTH DAILY 360 tablet 1    Pramipexole Dihydrochloride 0.25 MG Oral Tab Take 2 tablets (0.5 mg total) by mouth nightly. 180 tablet 3    OneTouch Delica Lancets 33G Does not apply Misc Use to test  glucose once daily as directed 100 each 0    Glucose Blood (ONETOUCH ULTRA) In Vitro Strip Use to test glucose once daily as directed 100 each 0    traMADol HCl 50 MG Oral Tab tramadol 50 mg tablet      folic acid 1 MG Oral Tab Take 1 tablet (1 mg total) by mouth in the morning and 1 tablet (1 mg total) before bedtime.      Probiotic Product (PROBIOTIC-10) Oral Chew Tab Chew by mouth.      Multiple Vitamins-Minerals (MULTIVITAMIN ADULT EXTRA C OR) multivitamin      cholecalciferol 1000 UNITS Oral Cap 1 capsule (1,000 Units total).      Ascorbic Acid (VITAMIN C) 500 MG Oral Cap       Diclofenac Sodium 1 % Transdermal Gel Apply 2 g topically.      Glucosamine Sulfate 500 MG Oral Tab Glucosamine 500 mg tablet   Take by oral route.      ONETOUCH LANCETS Does not apply Misc Test accucheck daily  each 3    Glucose Blood (ONETOUCH ULTRA BLUE) In Vitro Strip Use to test glucose at home daily prn 100 strip 3    methotrexate 2.5 MG Oral Tab Take 1 tablet (2.5 mg total) by mouth every 7 days. 4 tabs weekly      ACCU-CHEK SHADE PLUS In Vitro Strip       Accu-Chek Multiclix Lancets Does not apply Misc       ADVAIR DISKUS 500-50 MCG/ACT Inhalation Aerosol Powder, Breath Activated every 28 days. FOR 21 DAYS IN, THEN REMOVE FOR 7 DAYS (Patient not taking: Reported on 1/9/2025)      ALBUTEROL 108 (90 Base) MCG/ACT Inhalation Aero Soln INHALE 2 PUFFS BY MOUTH EVERY 4-6 HOURS AS NEEDED (Patient not taking: Reported on 1/9/2025) 8.5 each 6    naproxen 500 MG Oral Tab Take 1 tablet (500 mg total) by mouth 2 (two) times daily as needed. (Patient not taking: Reported on 1/9/2025) 30 tablet 0       Review of Systems:    Weight is up -- again-- increased metformin -- to consider ozempic -- never did go on   Now down 15#  No gerd - remains on ppi daily - some coming and going and with increased cough at times that gets better with ppi - no issues - occasionally  at night   No dairrhea   No swelling   Joints good and bad days --   tammy - no changes   Sleeping with some difficulty -now with new machine  and using nightly with encouragement from her -continues with oxygen when not using- using daily now   Now doing well with machine     Physical Exam:  /70   Pulse 77   Resp 16   Ht 4' 9\" (1.448 m)   Wt 185 lb (83.9 kg)   SpO2 96%   BMI 40.03 kg/m²    Constitutional: comfortable . No acute distress.  Sounds clear   HEENT: Head NC/AT. PEERL. Throat is clear -  Cardio: Regular rate and rhythm. Normal S1 and S2.   Resp   rare crackle that clears - no wheeze - ess clear   GI:  Abd soft, non-tender.obese   Extremities: No clubbing or cyanosis. No LE edema. No calf tenderness.  Neurologic: A&Ox3. No gross motor deficits.  Skin: Warm, dry.no rashes or hives noted   Lymphatic: No cervical or supraclavicular lymphadenopathy.no jvd   Psych: Calm, cooperative. Pleasant affect.    Results: reviewed     Assessment/Plan:  #Sleep disorder  No evidence of ALEXIA in 2008  continues to deny any apneic episodes  No evidence of progression of pulmonary hypertension continues on nocturnal O2  Increased snoring negative overnight in 2017 on O2 repeat overnight on 2 L with 2% of the time less than 90% to resume 2 L  5/21 increase noise with sleeping patient has an occasional awakening  11/21 increased awakening overnight with 4.6 events 7.4% with sats less than 90 reports not sleeping as well  8/22 worsening sleep disorder 1 set a.m.  12/22--underwent repeat sleep study with AHI 5.3 when supine 7.6 remy 81%  Agreeable to titration as patient is sleeping very poorly  3.23- titrated  and awaiting machine   5/23 reports using and benefiting though struggling at times-plans to follow-up with Dr. Dc concerning role of additional oxygen  12.23- doing very well on machine with AHI 0.5 -- also separate rooms - and less insomnia   6/24- doing well with ahi 0.4 -- needs to sleep more   1/25- doing well with machine - ahi 0.3     #Rheumatoid  arthritis seronegative  Remains on methotrexate though at weaning doses  Follows with Dr. Smith at [facility] group  Tolerating methotrexate wean--then required repeat increased dosing  3.23- decreasing -methotrexate dosing dropping   And stable   12.23- 4 pills and follows with dr munoz   6/24- remains on same meds-- methotrexate x 4- added tramadol   1/25- remains with methotrexate- controlled -         #GE reflux  Very stable on PPI insurance stopped paying  Tried to wean needs daily PPI notes breakthrough with H2 blocker only  11/19 with resurgence of GERD and coughing to resume PPI 10/20 heartburn with coffee  8/22 denies  3/23 remains on daily PPI  5.23- was bad for a while - now better - - takes nexium -- with some breakthrough--to trial pepcid - as not suppose to take tums   12.23- occasionally  breakthrough - remains on daily PPI nexium   6/24- resurgance and now on BID-- - cough seemed to resurge with  gerd-when had stopped her PPI transient- now on BID   1/25- remains on twice daily-consider decrease to daily after surgery      #Pulmonary hypertension  Had follow-up echo 2013 PAS went down from 40 to 29 mmHg  Evidence of diastolic dysfunction mild and improved with normal LVEF no recent issues remains on hydrochlorothiazide  12/22 notes increasing blood pressure--did not take HCTZ this a.m. now reports taking it faithfully--patient will call primary for need for increased medication  12/23 stable status  6/24- stable fluid status denies any symptoms  1/25- no swelling on hctz      #Allergic rhinitis  Waxing and waning over the years  Improves with nasal steroids/Qnasl  Worse in the spring  8/22 resumed Flonase  12/22 ongoing cough  present management  3/23 thinks mostly controlled  12.23- controlled - remains on flonase daily   6/24- remains stable on flonase   1/25- remains flonase daily         #Cough/asthma     History of mild flares of bronchitis/airway obstruction in the past normal  spirometry  Cough much improved with Breo used transiently  Clear chest x-ray no evidence of methotrexate issues  11/19 minimal resurgence but had stopped PPI nasal steroids and Breo-to resume serially  10/20 doing well denies cough-  8/22 remains controlled  12/22 increasing cough denies dyspnea-exam with mild rhonchi plans to resume Breo and follow  3.23 - - now clear - to stop ics/laba and follow   For pFTS   5/23 now with prolonged flare post presumed viral--seems predominantly upper airway-to complete PFTs on hold until clinically better  12.23- PFTS all nornal with DLCO increased from 52% to 81%   Using advair as needed and when sick and traveling   6/24- remains stable - - as needed and with travel -   1/25- doing well on as needed IC    # pre-op   For knee - scheduled for Monday   Discussed importance of anesthesia and CPAP perioperatively      #Depression/anxiety  Remains on medication      -Plan:  -plan to resume advair at first sign of illness , traveling , or if you feel like asthma - coughing etc -  -- RESUME advair twice a day starting Saturday                 - OK to decrease nexium to every morning after surgery   - continue  flonase  one or two puff each nostril every night  - keep up good work with machine   - see me in 6 months     Samantha Munoz MD  Pulmonary Medicine  1/9/25

## 2025-01-10 NOTE — H&P (VIEW-ONLY)
Patient ID: Shea Mixon     Chief Complaint:    Patient presents with:  Left Knee - Pre-Op       HPI:  Shea Mixon is a 73 year old female who presents today for evaluation of their left knee pain. History of right knee replacement 10 yrs ago by Dr. Moyer. Patient states pain started >15 yrs ago. Pain is constant and described as sharp, is worse with weight bearing. Factors that aggravate symptoms include weight bearing. Patient denies numbness, tingling, or radicular symptoms. Patient did a course of steroid injections and physical therapy, which provided minimal relief. The pain is a generalized aching predominantly over the medial aspect of the knee.  It has been progressive in nature but remains intermittent.  Worsened by prolonged standing or walking and squatting activities. The patient complains of popping sensations and stiffness.  The patient complains of occasional swelling.  The patient complains of slowly becoming more bow-legged.  The patient has pain with ambulation and stair climbing, they often walk with a limp.    Social History    Socioeconomic History      Marital status:       Spouse name: Not on file      Number of children: Not on file      Years of education: Not on file      Highest education level: Not on file    Occupational History      Not on file    Tobacco Use      Smoking status: Never      Smokeless tobacco: Never    Vaping Use      Vaping status: Never Used    Substance and Sexual Activity      Alcohol use: Yes        Comment: a couple of drinks per year      Drug use: Never      Sexual activity: Not on file    Other Topics      Concerns:        Not on file    Social History Narrative      Not on file    Social Determinants of Health  Financial Resource Strain: Not on file  Food Insecurity: Not on file  Transportation Needs: Not on file  Physical Activity: Not on file  Stress: Not on file  Social Connections: Not on file  Intimate Partner Violence: Not At Risk  (8/18/2023)      Received from [facility] Safety          Threatened: Not on file          Insulted: Not on file          Physically Hurt : Not on file          Scream: Not on file  Housing Stability: At Risk (8/18/2023)      Received from [facility] Housing          Living Situation: Not on file          Housing Problems: Not on file  Past Medical History:   Diagnosis Date   • Biceps tendinopathy, left 08/11/2020   • Closed fracture of unspecified bone(s) of foot (except toes) 06/29/2013    Log Date: 06/27/2013    • Diabetes ([facility])    • Essential hypertension    • Hearing loss 2019    wears a hearing aid   • Migraine    • Rheumatoid arthritis ([facility])     Dr Smith   • Sprain of foot, unspecified site 02/24/2015   • Tendinopathy of left rotator cuff 08/11/2020     Family History   Problem Relation Age of Onset   • Diabetes Father    • Heart Disorder Father    • Lipids Mother    • Cancer Mother    • Cancer Maternal Grandfather    • Cancer Brother 65        prostate cancer        ROS:    All other pertinent positives and negatives as noted above in HPI.     Physical Exam:     Vital Signs:  There were no vitals taken for this visit.  Estimated body mass index is 40.03 kg/m² as calculated from the following:    Height as of 12/20/24: 4' 9\" (1.448 m).    Weight as of 12/20/24: 185 lb (83.9 kg).    Musculoskeletal:    Left Knee:  Painful gait with a subtle limp  No muscle atrophy, erythema, ecchymosis, or gross deformity noted  mild knee effusion  + medial>lateral joint line tenderness  Active range of motion 2-115*  5/5 strength flexion and extension  The knee is stable to varus and valgus stress testing  mild varus alignment of the limb  Lachman negative  Posterior drawer negative  Balta's negative  Patellofemoral grind +  Sensation grossly intact to light tough throughout the lower extremity  Skin is intact  Distal pulses are 2+  No signs or symptoms of DVT    Exam of  the contralateral knee is normal:  No muscle atrophy, erythema, ecchymosis, or gross deformity noted  No knee effusion  No medial or lateral joint line tenderness  Full active range of motion  5/5 strength flexion and extension  The knee is stable to varus and valgus stress testing  Sensation grossly intact to light tough throughout the lower extremity  Skin is intact  Distal pulses are intact  No signs or symptoms of DVT    Bilateral Hip exam:  No atrophy, erythema, ecchymosis, or gross deformity noted  No tenderness to palpation  Slightly dimished active range of motion, mild lack of IR but nonpainful  5/5 strength in hip flexion, abduction, and adduction  Anterior, lateral, and posterior hip impingement tests negative  Stinchfield and straight leg raise negative  KYLE negative    Diagnostic Studies:     Imaging was personally and individually reviewed and discussed at length with the patient:    4V left knee(s) were reviewed  in the office today, including AP, flexion PA, lateral, and sunrise views:  Weight bearing views show significant degenerative changes in all three compartments with the medial compartment being most affected.  There is osteophyte formation throughout all three compartments.  There is end-stage osteoarthritis seen with bone-on-bone contact appreciated.  There is no evidence of fracture or dislocation.  No periosteal reactions or medullary lesions are seen.   Patellar height and alignment are within normal limits. Evidence of right total knee without signs of failure.     12/27/2024  Hgb: 12.9 (01/08/2025)  Alb: 4.24  Cr: 0.58  GFR: 95.7  MRSA: Negative; MSSA: ?      Assessment:     left Knee Osteoarthritis (severe) varus  Hx of R TKA (2014 Dr Moyer)  Hx of RA and immunodifficiency 2/2 drug use  Obesity BMI 40.6        Plan:     Based on x-rays, history, and office evaluation, we have diagnosed Shea Mixon with left knee arthritis. At this time, they have failed a conservative treatment  program. non steroid anti-infammatory medication(s), physical therapy, and corticosteroid injection(s) are no longer working.  Their symptoms are no longer relieved and have become more severe enough to significantly deteriorate their quality of life and affect their activities of daily living.  Their disability is significant enough to drastically alter their lifestyle.  Given the patient's symptoms and request, a left total knee arthroplasty is indicated.       The spectrum of treatment options were discussed with the patient in detail including both the nonoperative and operative treatment modalities and their respective risks and benefits.  After thorough discussion, the patient has elected to undergo surgical treatment.  The details of the surgical procedure were explained including the location of probable incisions and a description of the likely implants to be used.  Models and diagrams were used as educational resources. The patient understands the likely convalescence after surgery, as well as the rehabilitation required.  We thoroughly discussed the risks, benefits, and alternatives to surgery.  The risks include but are not limited to the risk of infection, joint stiffness, blood clots (including DVT and/or pulmonary embolus along with the risk of death), neurologic and/or vascular injury, fracture, dislocation, nonunion, malunion, need for further surgery including hardware failure requiring revision, and continued pain.  It was explained that if tissue has been repaired or reconstructed, there is also a chance of failure which may require further management.  In addition, we have discussed the risks, including the risk of disease transmission, associated with any allograft tissue which may be utilized.  Following the completion of the discussion, the patient expressed understanding of this planned course of care, all their questions were answered and consent will be obtained preoperatively.    Primary  Left TKA at [facility] ([facility]) with hx of complications with anesthesia - 01/13/2025    no smoking  yes diabetes -  Last A1c value was 5.8% done 11/8/2024.  BMI - 40.6  yes ALEXIA - uses cpap  no hx of infections/MRSA/dental infections  no allergies  no hx of blood clots   no blood thinner  last injection 7/2024  no cardiac issues  no pulmonary issues  Will plan for prophylactic abx given higher risk with obesity, DM and immunodifficiency with RA    - risks, benefits and alternatives discussed  - labs reviewed and bactroban given   - PPX antibiotics sent to pharmacy  - proceed with left total knee arthroplasty as planned    Plan reviewed with Dr. Zita Rabago, [facility]            Diagnoses and all orders for this visit:    Primary osteoarthritis of left knee    S/P total knee arthroplasty, left  -     acetaminophen 325 MG Oral Tab; Take 2 tablets (650 mg total) by mouth every 4 (four) hours for 14 days. Ok to take over the counter and ok to continue past 2 weeks. Do not exceed 4,000 mg per day.  -     aspirin 81 MG Oral Tab EC; Take 1 tablet (81 mg total) by mouth 2 (two) times daily. To take for 6 weeks total for blood clot prevention.  -     celecoxib (CELEBREX) 200 MG Oral Cap; Take 1 capsule (200 mg total) by mouth daily. Take 1 capsule (200 mg total) by mouth daily. Take with food.  -     PT AND/OR OT EVAL & TREAT (DULY); Standing  -     HOME HEALTH (EXT)  -     pregabalin (LYRICA) 75 MG Oral Cap; Take 1 capsule (75 mg total) by mouth daily.  -     OXYCODONE 5 MG Oral Tab; Take 1 tablet (5 mg total) by mouth every 4 (four) hours as needed for Pain.  -     senna-docusate 8.6-50 MG Oral Tab; Take 1 tablet by mouth daily. Take daily while taking narcotics regularly for pain.  -     traMADol 50 MG Oral Tab; Take 1 tablet (50 mg total) by mouth every 4 to 6 hours as needed for Pain (Can alternate with Oxycodone). Do not take along with oxycodone or norco (hydrocodone) - separate by 2 hours if needed  -     cephalexin  (KEFLEX) 500 MG Oral Cap; Take 1 capsule (500 mg total) by mouth 2 (two) times a day for 10 days.  -     mupirocin 2 % External Ointment; Apply 1 Application. topically 2 (two) times daily for 5 days. Apply to each nostril starting today, including morning of.

## 2025-01-10 NOTE — DISCHARGE INSTRUCTIONS
Sometimes managing your health at home requires assistance.  The [facility] team has recognized your preference to use [facility].  They can be reached by phone at [phone].  The fax number for your reference is [phone].  A representative from the home health agency will contact you or your family to schedule your first visit.     Knee Replacement Discharge Instructions    Dear Patient,     [facility] cares about your progress with recovery following your joint replacement surgery.     300 days from your scheduled surgery, [facility] will send you a follow-up survey to help us understand how your surgery impacted your mobility, pain, and overall quality of life. Please make every effort to complete this survey. The information collected from this survey will be used by your physician to track your recovery.     Sincerely,     [facility] [place]      Activity    Bathing  No tub baths, pools, or saunas until cleared by surgeon (about 4-6 weeks because it takes that long for the incision on the skin to heal and be a barrier to prevent infection).  When allowed to shower:    IF AQUACEL - dressing is waterproof and does not require being covered to keep it dry.  Pat dressing and surrounding skin dry after shower              AQUACEL          Gauze dressings are NOT waterproof and REQUIRE being covered with a waterproof barrier to keep the dressing and the incision dry.  SARAN WRAP, GLAD WRAP, and PRESS N SEAL WORK  REALLY WELL BUT ANY PLASTIC WRAP WILL DO.   Do not wash incision.   Remove entire wrapping and old dressing (if Gauze) after showering. Pat dry if necessary WITH A CLEAN TOWEL and cover incision with dry sterile gauze and paper tape. For other types of dressings, follow surgeon’s orders.                                 GAUZE          Driving  Do not drive until cleared by surgeon. This is usually in four to six weeks after surgery.  Discuss at follow-up office visit.   Not allowed while taking narcotic pain medication or muscle relaxants.    Sex  Usually allowed after four to six weeks - check with surgeon at your office visit.    Return to work  Usually allowed after four to six weeks. Discuss specific work activities with your surgeon.    Restrictions  For knee replacement surgery, follow instructions provided by physical therapy.  Do NOT put a pillow under your knee as it may be more difficult to straighten afterwards.    No smoking  Avoid smoking. It is known to cause breathing problems and can decrease the rate of healing.    Incision care/Dressing changes  Wash hands before and after dressing changes.  FOR DRY GAUZE DRESSING:  Change dressing daily using dry sterile gauze and paper tape once Aquacel (waterproof) dressing changed (which is about 7 days after surgery). Continue this until you follow up in the office with your surgeon. Have knee bent when changing dressing for more ease of bending afterwards.  There could be a small amount of redness around the staples or incision; this is normal.  Watch for increased redness, warmth, any odor, increased drainage or opening of the incision. A little clear yellow or blood tinged drainage is normal up to 2 weeks after surgery but it should be less every day until it stops.  Call physician if you notice any concerning changes.  Sutures/staples will be removed at first office visit (10 days- 3 weeks).                          GAUZE                                                   Medication  Anticoagulants = blood thinners (Xarelto, Eliquis, Lovenox, Coumadin, or Aspirin)  Pill or shot form depending on what your physician orders.   IF placed on Coumadin, you may also need lab work done for monitoring.  You will bleed easier and bruise easier while on these medications.   Usually you will be on a blood thinner for about 2-5 weeks depending what physician orders.  Contact your physician if you  have signs of bruising, nose bleeds or blood in your urine. You may consider using electric razors and soft bristle toothbrushes.  Do not take aspirin while taking blood thinners unless ordered by your physician.  Review anticoagulant education information sheet provided.    Discomfort  Surgical discomfort is normal for one to two months.  Have realistic goals and keep a positive outlook.  You may need pain medication regularly (every 4-6 hours) the first 2 weeks and then begin to decrease how often you are taking it.  Take pain medication as prescribed with food, especially before therapy, allowing 30-60 minutes to take effect.  Do not drink alcohol while on pain medication.  Keep pain manageable; pain should not disrupt your sleep or activities like getting out of bed or walking.  As you have less discomfort, decrease the amount of pain medication you take. Use plain Tylenol (acetaminophen) for less severe pain.  Some pain medications have Tylenol (acetaminophen) in them such as Norco and Percocet. Do NOT take Tylenol (acetaminophen) within 4 hours of a dose of these medications.  Apply ice or cold therapy to surgical site for 20 minutes at least four times a day, especially after therapy. Be sure there is a thin cloth barrier between skin and ice or cold therapy.  Change position at least every 45 minutes while awake to avoid stiffness or increased discomfort.  For knee replacements- may elevate your leg by placing a pillow under entire leg. Do not place pillow only under the knee.  Have realistic goals and keep a positive outlook.  Deep breathing and relaxation techniques and distractions can help!  If you focus on something else, you do not experience the pain the same. Take advantage of everything available to your to help control you discomfort.  Contact physician if discomfort does not respond to pain medication.    Body changes  Constipation is common with the use of narcotics.  Eat fiber rich foods and  drink plenty of fluids.  Use stool softeners such as Colace or Senakot while on narcotics, and laxatives such as Miralax or Milk of Magnesia if needed.   An enema or suppository may be needed if above measures do not work.    Prevention of infection and promotion of healing  Good hand washing is important. Everyone should wash their hands or use hand  as soon as they walk in your house-whether they live there or are visiting.  Keep bed linen/clothing freshly laundered.  Do not allow others or pets to touch your incision.  Avoid people that have colds or the flu.  Your surgeon may recommend that you take antibiotics before you undergo any dental or other invasive surgical procedures after your joint replacement. Speak with your physician about this at your post-op office visit.  Eat a balanced diet high in fiber and drink plenty of fluids.   Continue using incentive spirometry because narcotics make you sleepy so you may not take good deep breaths. We do not want you to get pneumonia.     Post op Office visits  Schedule 10 days to 3 weeks after surgery WITH SURGEON’s office.  Additional visits may need to be scheduled. Your physician will discuss this at first post-op office visit.  Schedule outpatient physical therapy per your surgeon’s orders.  Schedule one week follow up after surgery WITH PRIMARY CARE PHYSICIAN; review your medications over last 6 months. Your body gets stressed by surgery and that stress can affect all your other health issues (such as high blood pressure, diabetes, CHF, afib, and asthma just to name a few).  We don’t want those other health issues to cause you to get readmitted to the hospital; much better for you to catch developing problems and prevent them from becoming larger ones.  STEFANIA HOSE - IF ordered by your surgeon, wear these during the day and off at night.      Notify your surgeon if you notice any  of the following signs  Separation of incision line.  Increased redness,  swelling, or warmth of skin around incision.  Increased or foul smelling drainage from incision  Red streaks on skin near incision.  Temperature >101 F.  Increased pain at incision not relieved by pain medication.      Signs of blood clot  Pain, excessive tenderness, redness, or swelling in leg or calf (other than incision site).  (CALL SURGEON)      Go directly to the ER or CALL 911 if  you:  become short of breath  have chest pain  cough up blood  have unexplained anxiety with breathing  Traveling and Handicapped parking  Check with you surgeon when allowed so you don’t set yourself up for greater chance of complications.  If traveling by car, get out to stretch every 2 hours.  This helps prevent stiffness.  You may need to do this any time you travel for the first year after surgery.  If traveling by plane, BEFORE you get into a security line, let them know that you had your hip replaced, as you will most likely set off the metal detector. The doctors no longer provide an identification card for this as they are easily copied. ALSO request a wheelchair the first year to board and get off a plane…this aids in priority seating and you should sit on the aisle or at the bulkhead where you can easily stretch your legs and get up to walk up and down the aisles…this helps prevent blood clots and stiffness.  TEMPORARY HANDICAP PARKING APPLICATION  (good for 3-6 months)  - At Surgeon or PCP visit, request they fill out the form, then go to DMV (only time you do not wait in a long line there). Some [facility] offices provide the same service. ([place] and [place] have this service; if you live in another [facility], you may check with them as well). You need space to open car doors to position yourself properly with walker to get in and out of your car safely; some parking spaces are  practically on top of each other and do not give you enough room.    SPECIAL  INSTRUCTIONS:  Spanda- knee replacement (single  layer compression sleeve):  All patients should wear the compression sleeves (SPANDA-) until first follow up visit to surgeon’s office ( about 2-3 weeks) and then check with surgeon if need to continue use.  Take off to shower. Best to keep on as much as possible, even at night.  Wash with mild soap and water; DRIP dry overnight.    Directions to put on and off:  IT TAKES 2 PIECES OF SPANDA- (compression sleeves), (USUALLY DIFFERENT SIZES because a calf is usually smaller than a knee.)   Use the longer tube (spanda-/compression sleeve) pulled up over the calf.   This 1st piece (spanda-/compression sleeve) should be on the calf part of the leg, from just below the knee to the ball of the foot to expose the toes.   The 2nd piece (shorter compression sleeve) is pulled over and past the first sleeve onto the knee. The lower edge of the knee portion should overlap the top of the calf spanda- by a few inches. This is the only place where the 2 different pieces overlap.  The top edge of the second spanda- should be about a few inches above the knee but it should NOT be rolling down. The spanda- should be flat so that it does not roll and become too tight.  Makes sure it is NOT bunched up anywhere.   IF the spanda- feels TOO tight, then REMOVE it and call your surgeon to let them know.

## 2025-01-13 ENCOUNTER — ANESTHESIA (OUTPATIENT)
Dept: SURGERY | Facility: HOSPITAL | Age: 75
End: 2025-01-13
Payer: MEDICARE

## 2025-01-13 ENCOUNTER — HOSPITAL ENCOUNTER (OUTPATIENT)
Facility: HOSPITAL | Age: 75
Discharge: HOME HEALTH | End: 2025-01-14
Attending: ORTHOPAEDIC SURGERY | Admitting: ORTHOPAEDIC SURGERY
Payer: MEDICARE

## 2025-01-13 ENCOUNTER — APPOINTMENT (OUTPATIENT)
Dept: GENERAL RADIOLOGY | Facility: HOSPITAL | Age: 75
End: 2025-01-13
Attending: ORTHOPAEDIC SURGERY
Payer: MEDICARE

## 2025-01-13 ENCOUNTER — ANESTHESIA EVENT (OUTPATIENT)
Dept: SURGERY | Facility: HOSPITAL | Age: 75
End: 2025-01-13
Payer: MEDICARE

## 2025-01-13 DIAGNOSIS — Z01.818 PRE-OP TESTING: Primary | ICD-10-CM

## 2025-01-13 LAB
GLUCOSE BLD-MCNC: 107 MG/DL (ref 70–99)
GLUCOSE BLD-MCNC: 130 MG/DL (ref 70–99)
GLUCOSE BLD-MCNC: 133 MG/DL (ref 70–99)
RH BLOOD TYPE: POSITIVE

## 2025-01-13 PROCEDURE — 82962 GLUCOSE BLOOD TEST: CPT

## 2025-01-13 PROCEDURE — 73560 X-RAY EXAM OF KNEE 1 OR 2: CPT | Performed by: ORTHOPAEDIC SURGERY

## 2025-01-13 PROCEDURE — 94660 CPAP INITIATION&MGMT: CPT

## 2025-01-13 PROCEDURE — 88311 DECALCIFY TISSUE: CPT | Performed by: ORTHOPAEDIC SURGERY

## 2025-01-13 PROCEDURE — 88305 TISSUE EXAM BY PATHOLOGIST: CPT | Performed by: ORTHOPAEDIC SURGERY

## 2025-01-13 PROCEDURE — S0028 INJECTION, FAMOTIDINE, 20 MG: HCPCS | Performed by: ANESTHESIOLOGY

## 2025-01-13 PROCEDURE — 76942 ECHO GUIDE FOR BIOPSY: CPT | Performed by: ANESTHESIOLOGY

## 2025-01-13 PROCEDURE — 0SRD0J9 REPLACEMENT OF LEFT KNEE JOINT WITH SYNTHETIC SUBSTITUTE, CEMENTED, OPEN APPROACH: ICD-10-PCS | Performed by: ORTHOPAEDIC SURGERY

## 2025-01-13 DEVICE — IMPLANTABLE DEVICE
Type: IMPLANTABLE DEVICE | Site: KNEE | Status: FUNCTIONAL
Brand: PERSONA® VIVACIT-E®

## 2025-01-13 DEVICE — IMPLANTABLE DEVICE
Type: IMPLANTABLE DEVICE | Site: KNEE | Status: FUNCTIONAL
Brand: PERSONA®

## 2025-01-13 DEVICE — IMPLANTABLE DEVICE
Type: IMPLANTABLE DEVICE | Site: KNEE | Status: FUNCTIONAL
Brand: PERSONA® NATURAL TIBIA®

## 2025-01-13 DEVICE — IMPLANTABLE DEVICE
Type: IMPLANTABLE DEVICE | Site: KNEE | Status: FUNCTIONAL
Brand: REFOBACIN® BONE CEMENT R

## 2025-01-13 RX ORDER — DEXTROSE MONOHYDRATE 25 G/50ML
50 INJECTION, SOLUTION INTRAVENOUS
Status: DISCONTINUED | OUTPATIENT
Start: 2025-01-13 | End: 2025-01-13 | Stop reason: HOSPADM

## 2025-01-13 RX ORDER — ROSUVASTATIN CALCIUM 5 MG/1
10 TABLET, COATED ORAL NIGHTLY
Status: DISCONTINUED | OUTPATIENT
Start: 2025-01-13 | End: 2025-01-14

## 2025-01-13 RX ORDER — CELECOXIB 200 MG/1
200 CAPSULE ORAL DAILY
COMMUNITY

## 2025-01-13 RX ORDER — HYDROCODONE BITARTRATE AND ACETAMINOPHEN 10; 325 MG/1; MG/1
2 TABLET ORAL ONCE AS NEEDED
Status: DISCONTINUED | OUTPATIENT
Start: 2025-01-13 | End: 2025-01-13 | Stop reason: HOSPADM

## 2025-01-13 RX ORDER — HYDROCODONE BITARTRATE AND ACETAMINOPHEN 10; 325 MG/1; MG/1
1 TABLET ORAL ONCE AS NEEDED
Status: DISCONTINUED | OUTPATIENT
Start: 2025-01-13 | End: 2025-01-13 | Stop reason: HOSPADM

## 2025-01-13 RX ORDER — OXYCODONE HYDROCHLORIDE 5 MG/1
1 TABLET ORAL EVERY 4 HOURS PRN
COMMUNITY

## 2025-01-13 RX ORDER — CEPHALEXIN 500 MG/1
500 CAPSULE ORAL 2 TIMES DAILY
COMMUNITY

## 2025-01-13 RX ORDER — BISACODYL 10 MG
10 SUPPOSITORY, RECTAL RECTAL
Status: DISCONTINUED | OUTPATIENT
Start: 2025-01-13 | End: 2025-01-14

## 2025-01-13 RX ORDER — SODIUM PHOSPHATE, DIBASIC AND SODIUM PHOSPHATE, MONOBASIC 7; 19 G/230ML; G/230ML
1 ENEMA RECTAL ONCE AS NEEDED
Status: DISCONTINUED | OUTPATIENT
Start: 2025-01-13 | End: 2025-01-14

## 2025-01-13 RX ORDER — DIPHENHYDRAMINE HCL 25 MG
25 CAPSULE ORAL EVERY 4 HOURS PRN
Status: DISCONTINUED | OUTPATIENT
Start: 2025-01-13 | End: 2025-01-14

## 2025-01-13 RX ORDER — DEXAMETHASONE SODIUM PHOSPHATE 10 MG/ML
8 INJECTION, SOLUTION INTRAMUSCULAR; INTRAVENOUS ONCE
Status: COMPLETED | OUTPATIENT
Start: 2025-01-14 | End: 2025-01-14

## 2025-01-13 RX ORDER — FERROUS SULFATE 325(65) MG
325 TABLET, DELAYED RELEASE (ENTERIC COATED) ORAL
Status: DISCONTINUED | OUTPATIENT
Start: 2025-01-14 | End: 2025-01-14

## 2025-01-13 RX ORDER — NICOTINE POLACRILEX 4 MG
30 LOZENGE BUCCAL
Status: DISCONTINUED | OUTPATIENT
Start: 2025-01-13 | End: 2025-01-14

## 2025-01-13 RX ORDER — DIPHENHYDRAMINE HYDROCHLORIDE 50 MG/ML
25 INJECTION INTRAMUSCULAR; INTRAVENOUS ONCE AS NEEDED
Status: ACTIVE | OUTPATIENT
Start: 2025-01-13 | End: 2025-01-13

## 2025-01-13 RX ORDER — ACETAMINOPHEN 325 MG/1
650 TABLET ORAL ONCE
Status: COMPLETED | OUTPATIENT
Start: 2025-01-13 | End: 2025-01-13

## 2025-01-13 RX ORDER — ROCURONIUM BROMIDE 10 MG/ML
INJECTION, SOLUTION INTRAVENOUS AS NEEDED
Status: DISCONTINUED | OUTPATIENT
Start: 2025-01-13 | End: 2025-01-13 | Stop reason: SURG

## 2025-01-13 RX ORDER — ACETAMINOPHEN 500 MG
1000 TABLET ORAL ONCE
Status: DISCONTINUED | OUTPATIENT
Start: 2025-01-13 | End: 2025-01-13 | Stop reason: HOSPADM

## 2025-01-13 RX ORDER — ONDANSETRON 2 MG/ML
4 INJECTION INTRAMUSCULAR; INTRAVENOUS EVERY 6 HOURS PRN
Status: DISCONTINUED | OUTPATIENT
Start: 2025-01-13 | End: 2025-01-13 | Stop reason: HOSPADM

## 2025-01-13 RX ORDER — HYDROMORPHONE HYDROCHLORIDE 1 MG/ML
INJECTION, SOLUTION INTRAMUSCULAR; INTRAVENOUS; SUBCUTANEOUS
Status: COMPLETED
Start: 2025-01-13 | End: 2025-01-13

## 2025-01-13 RX ORDER — HYDROMORPHONE HYDROCHLORIDE 1 MG/ML
0.4 INJECTION, SOLUTION INTRAMUSCULAR; INTRAVENOUS; SUBCUTANEOUS EVERY 2 HOUR PRN
Status: DISCONTINUED | OUTPATIENT
Start: 2025-01-13 | End: 2025-01-14

## 2025-01-13 RX ORDER — MUPIROCIN 20 MG/G
1 OINTMENT TOPICAL 2 TIMES DAILY
COMMUNITY
Start: 2025-01-10 | End: 2025-01-15

## 2025-01-13 RX ORDER — NICOTINE POLACRILEX 4 MG
15 LOZENGE BUCCAL
Status: DISCONTINUED | OUTPATIENT
Start: 2025-01-13 | End: 2025-01-14

## 2025-01-13 RX ORDER — IBUPROFEN 600 MG/1
600 TABLET, FILM COATED ORAL EVERY 6 HOURS SCHEDULED
Status: DISCONTINUED | OUTPATIENT
Start: 2025-01-14 | End: 2025-01-14

## 2025-01-13 RX ORDER — METOCLOPRAMIDE HYDROCHLORIDE 5 MG/ML
10 INJECTION INTRAMUSCULAR; INTRAVENOUS EVERY 8 HOURS PRN
Status: DISCONTINUED | OUTPATIENT
Start: 2025-01-13 | End: 2025-01-13 | Stop reason: HOSPADM

## 2025-01-13 RX ORDER — NALOXONE HYDROCHLORIDE 0.4 MG/ML
0.08 INJECTION, SOLUTION INTRAMUSCULAR; INTRAVENOUS; SUBCUTANEOUS AS NEEDED
Status: DISCONTINUED | OUTPATIENT
Start: 2025-01-13 | End: 2025-01-13 | Stop reason: HOSPADM

## 2025-01-13 RX ORDER — TRANEXAMIC ACID 10 MG/ML
1000 INJECTION, SOLUTION INTRAVENOUS ONCE
Status: DISCONTINUED | OUTPATIENT
Start: 2025-01-13 | End: 2025-01-13 | Stop reason: HOSPADM

## 2025-01-13 RX ORDER — DIVALPROEX SODIUM 250 MG/1
250 TABLET, FILM COATED, EXTENDED RELEASE ORAL 2 TIMES DAILY
COMMUNITY

## 2025-01-13 RX ORDER — CARVEDILOL 12.5 MG/1
12.5 TABLET ORAL 2 TIMES DAILY WITH MEALS
Status: DISCONTINUED | OUTPATIENT
Start: 2025-01-13 | End: 2025-01-14

## 2025-01-13 RX ORDER — HYDROMORPHONE HYDROCHLORIDE 1 MG/ML
0.2 INJECTION, SOLUTION INTRAMUSCULAR; INTRAVENOUS; SUBCUTANEOUS EVERY 2 HOUR PRN
Status: DISCONTINUED | OUTPATIENT
Start: 2025-01-13 | End: 2025-01-14

## 2025-01-13 RX ORDER — METOCLOPRAMIDE HYDROCHLORIDE 5 MG/ML
10 INJECTION INTRAMUSCULAR; INTRAVENOUS EVERY 8 HOURS PRN
Status: DISCONTINUED | OUTPATIENT
Start: 2025-01-13 | End: 2025-01-14

## 2025-01-13 RX ORDER — PRAMIPEXOLE DIHYDROCHLORIDE 0.25 MG/1
0.5 TABLET ORAL NIGHTLY
Status: DISCONTINUED | OUTPATIENT
Start: 2025-01-13 | End: 2025-01-14

## 2025-01-13 RX ORDER — POLYETHYLENE GLYCOL 3350 17 G/17G
17 POWDER, FOR SOLUTION ORAL DAILY PRN
Status: DISCONTINUED | OUTPATIENT
Start: 2025-01-13 | End: 2025-01-14

## 2025-01-13 RX ORDER — DOCUSATE SODIUM 100 MG/1
100 CAPSULE, LIQUID FILLED ORAL 2 TIMES DAILY
Status: DISCONTINUED | OUTPATIENT
Start: 2025-01-13 | End: 2025-01-14

## 2025-01-13 RX ORDER — ASPIRIN 81 MG/1
81 TABLET ORAL 2 TIMES DAILY
Status: SHIPPED | COMMUNITY
Start: 2025-01-13

## 2025-01-13 RX ORDER — LIDOCAINE HYDROCHLORIDE 10 MG/ML
INJECTION, SOLUTION EPIDURAL; INFILTRATION; INTRACAUDAL; PERINEURAL AS NEEDED
Status: DISCONTINUED | OUTPATIENT
Start: 2025-01-13 | End: 2025-01-13 | Stop reason: SURG

## 2025-01-13 RX ORDER — KETOROLAC TROMETHAMINE 30 MG/ML
15 INJECTION, SOLUTION INTRAMUSCULAR; INTRAVENOUS EVERY 6 HOURS
Status: DISCONTINUED | OUTPATIENT
Start: 2025-01-13 | End: 2025-01-14

## 2025-01-13 RX ORDER — NICOTINE POLACRILEX 4 MG
15 LOZENGE BUCCAL
Status: DISCONTINUED | OUTPATIENT
Start: 2025-01-13 | End: 2025-01-13 | Stop reason: HOSPADM

## 2025-01-13 RX ORDER — FAMOTIDINE 10 MG/ML
20 INJECTION, SOLUTION INTRAVENOUS 2 TIMES DAILY
Status: DISCONTINUED | OUTPATIENT
Start: 2025-01-13 | End: 2025-01-13

## 2025-01-13 RX ORDER — KETAMINE HYDROCHLORIDE 50 MG/ML
INJECTION, SOLUTION INTRAMUSCULAR; INTRAVENOUS AS NEEDED
Status: DISCONTINUED | OUTPATIENT
Start: 2025-01-13 | End: 2025-01-13 | Stop reason: SURG

## 2025-01-13 RX ORDER — OXYCODONE HYDROCHLORIDE 5 MG/1
5 TABLET ORAL EVERY 4 HOURS PRN
Status: DISCONTINUED | OUTPATIENT
Start: 2025-01-13 | End: 2025-01-14

## 2025-01-13 RX ORDER — FAMOTIDINE 20 MG/1
20 TABLET, FILM COATED ORAL 2 TIMES DAILY
Status: DISCONTINUED | OUTPATIENT
Start: 2025-01-13 | End: 2025-01-13

## 2025-01-13 RX ORDER — ACETAMINOPHEN 325 MG/1
650 TABLET ORAL EVERY 4 HOURS
COMMUNITY

## 2025-01-13 RX ORDER — AMOXICILLIN 250 MG
1 CAPSULE ORAL DAILY
COMMUNITY

## 2025-01-13 RX ORDER — TRANEXAMIC ACID 10 MG/ML
INJECTION, SOLUTION INTRAVENOUS AS NEEDED
Status: DISCONTINUED | OUTPATIENT
Start: 2025-01-13 | End: 2025-01-13 | Stop reason: SURG

## 2025-01-13 RX ORDER — DIPHENHYDRAMINE HYDROCHLORIDE 50 MG/ML
12.5 INJECTION INTRAMUSCULAR; INTRAVENOUS EVERY 4 HOURS PRN
Status: DISCONTINUED | OUTPATIENT
Start: 2025-01-13 | End: 2025-01-14

## 2025-01-13 RX ORDER — SODIUM CHLORIDE, SODIUM LACTATE, POTASSIUM CHLORIDE, CALCIUM CHLORIDE 600; 310; 30; 20 MG/100ML; MG/100ML; MG/100ML; MG/100ML
INJECTION, SOLUTION INTRAVENOUS CONTINUOUS
Status: DISCONTINUED | OUTPATIENT
Start: 2025-01-13 | End: 2025-01-14

## 2025-01-13 RX ORDER — ONDANSETRON 2 MG/ML
4 INJECTION INTRAMUSCULAR; INTRAVENOUS EVERY 6 HOURS PRN
Status: DISCONTINUED | OUTPATIENT
Start: 2025-01-13 | End: 2025-01-14

## 2025-01-13 RX ORDER — SENNOSIDES 8.6 MG
17.2 TABLET ORAL NIGHTLY
Status: DISCONTINUED | OUTPATIENT
Start: 2025-01-13 | End: 2025-01-14

## 2025-01-13 RX ORDER — DEXTROSE MONOHYDRATE 25 G/50ML
50 INJECTION, SOLUTION INTRAVENOUS
Status: DISCONTINUED | OUTPATIENT
Start: 2025-01-13 | End: 2025-01-14

## 2025-01-13 RX ORDER — FAMOTIDINE 10 MG/ML
INJECTION, SOLUTION INTRAVENOUS AS NEEDED
Status: DISCONTINUED | OUTPATIENT
Start: 2025-01-13 | End: 2025-01-13 | Stop reason: SURG

## 2025-01-13 RX ORDER — SUMATRIPTAN 50 MG/1
100 TABLET, FILM COATED ORAL EVERY 2 HOUR PRN
Status: DISCONTINUED | OUTPATIENT
Start: 2025-01-13 | End: 2025-01-14

## 2025-01-13 RX ORDER — HYDROMORPHONE HYDROCHLORIDE 1 MG/ML
0.4 INJECTION, SOLUTION INTRAMUSCULAR; INTRAVENOUS; SUBCUTANEOUS EVERY 5 MIN PRN
Status: DISCONTINUED | OUTPATIENT
Start: 2025-01-13 | End: 2025-01-13 | Stop reason: HOSPADM

## 2025-01-13 RX ORDER — PANTOPRAZOLE SODIUM 40 MG/1
40 TABLET, DELAYED RELEASE ORAL
Status: DISCONTINUED | OUTPATIENT
Start: 2025-01-14 | End: 2025-01-14

## 2025-01-13 RX ORDER — ACETAMINOPHEN 325 MG/1
650 TABLET ORAL
Status: DISCONTINUED | OUTPATIENT
Start: 2025-01-13 | End: 2025-01-14

## 2025-01-13 RX ORDER — HYDROMORPHONE HYDROCHLORIDE 1 MG/ML
0.2 INJECTION, SOLUTION INTRAMUSCULAR; INTRAVENOUS; SUBCUTANEOUS EVERY 5 MIN PRN
Status: DISCONTINUED | OUTPATIENT
Start: 2025-01-13 | End: 2025-01-13 | Stop reason: HOSPADM

## 2025-01-13 RX ORDER — ALBUTEROL SULFATE 90 UG/1
2 INHALANT RESPIRATORY (INHALATION) EVERY 6 HOURS PRN
Status: DISCONTINUED | OUTPATIENT
Start: 2025-01-13 | End: 2025-01-14

## 2025-01-13 RX ORDER — DEXAMETHASONE SODIUM PHOSPHATE 4 MG/ML
VIAL (ML) INJECTION AS NEEDED
Status: DISCONTINUED | OUTPATIENT
Start: 2025-01-13 | End: 2025-01-13 | Stop reason: SURG

## 2025-01-13 RX ORDER — FLUTICASONE PROPIONATE AND SALMETEROL 500; 50 UG/1; UG/1
1 POWDER RESPIRATORY (INHALATION) 2 TIMES DAILY
Status: DISCONTINUED | OUTPATIENT
Start: 2025-01-13 | End: 2025-01-14

## 2025-01-13 RX ORDER — ACETAMINOPHEN 500 MG
1000 TABLET ORAL ONCE AS NEEDED
Status: DISCONTINUED | OUTPATIENT
Start: 2025-01-13 | End: 2025-01-13 | Stop reason: HOSPADM

## 2025-01-13 RX ORDER — BUPIVACAINE HYDROCHLORIDE AND EPINEPHRINE 2.5; 5 MG/ML; UG/ML
INJECTION, SOLUTION EPIDURAL; INFILTRATION; INTRACAUDAL; PERINEURAL AS NEEDED
Status: DISCONTINUED | OUTPATIENT
Start: 2025-01-13 | End: 2025-01-13 | Stop reason: SURG

## 2025-01-13 RX ORDER — ACETAMINOPHEN 325 MG/1
TABLET ORAL
Status: COMPLETED
Start: 2025-01-13 | End: 2025-01-13

## 2025-01-13 RX ORDER — NICOTINE POLACRILEX 4 MG
30 LOZENGE BUCCAL
Status: DISCONTINUED | OUTPATIENT
Start: 2025-01-13 | End: 2025-01-13 | Stop reason: HOSPADM

## 2025-01-13 RX ORDER — ASPIRIN 81 MG/1
81 TABLET ORAL 2 TIMES DAILY
Status: DISCONTINUED | OUTPATIENT
Start: 2025-01-13 | End: 2025-01-14

## 2025-01-13 RX ORDER — DIVALPROEX SODIUM 250 MG/1
250 TABLET, FILM COATED, EXTENDED RELEASE ORAL 2 TIMES DAILY
Status: DISCONTINUED | OUTPATIENT
Start: 2025-01-13 | End: 2025-01-14

## 2025-01-13 RX ORDER — FOLIC ACID 1 MG/1
1 TABLET ORAL DAILY
Status: DISCONTINUED | OUTPATIENT
Start: 2025-01-14 | End: 2025-01-14

## 2025-01-13 RX ORDER — SODIUM CHLORIDE, SODIUM LACTATE, POTASSIUM CHLORIDE, CALCIUM CHLORIDE 600; 310; 30; 20 MG/100ML; MG/100ML; MG/100ML; MG/100ML
INJECTION, SOLUTION INTRAVENOUS CONTINUOUS
Status: DISCONTINUED | OUTPATIENT
Start: 2025-01-13 | End: 2025-01-13 | Stop reason: HOSPADM

## 2025-01-13 RX ORDER — ONDANSETRON 2 MG/ML
INJECTION INTRAMUSCULAR; INTRAVENOUS AS NEEDED
Status: DISCONTINUED | OUTPATIENT
Start: 2025-01-13 | End: 2025-01-13 | Stop reason: SURG

## 2025-01-13 RX ORDER — METOCLOPRAMIDE HYDROCHLORIDE 5 MG/ML
INJECTION INTRAMUSCULAR; INTRAVENOUS
Status: COMPLETED
Start: 2025-01-13 | End: 2025-01-13

## 2025-01-13 RX ORDER — OXYBUTYNIN CHLORIDE 5 MG/1
5 TABLET, EXTENDED RELEASE ORAL DAILY
Status: DISCONTINUED | OUTPATIENT
Start: 2025-01-14 | End: 2025-01-14

## 2025-01-13 RX ORDER — HYDROMORPHONE HYDROCHLORIDE 1 MG/ML
0.6 INJECTION, SOLUTION INTRAMUSCULAR; INTRAVENOUS; SUBCUTANEOUS EVERY 5 MIN PRN
Status: DISCONTINUED | OUTPATIENT
Start: 2025-01-13 | End: 2025-01-13 | Stop reason: HOSPADM

## 2025-01-13 RX ADMIN — LIDOCAINE HYDROCHLORIDE 5 ML: 10 INJECTION, SOLUTION EPIDURAL; INFILTRATION; INTRACAUDAL; PERINEURAL at 12:16:00

## 2025-01-13 RX ADMIN — BUPIVACAINE HYDROCHLORIDE AND EPINEPHRINE 25 ML: 2.5; 5 INJECTION, SOLUTION EPIDURAL; INFILTRATION; INTRACAUDAL; PERINEURAL at 12:22:00

## 2025-01-13 RX ADMIN — DEXAMETHASONE SODIUM PHOSPHATE 4 MG: 4 MG/ML VIAL (ML) INJECTION at 12:30:00

## 2025-01-13 RX ADMIN — FAMOTIDINE 20 MG: 10 INJECTION, SOLUTION INTRAVENOUS at 12:30:00

## 2025-01-13 RX ADMIN — TRANEXAMIC ACID 1000 MG: 10 INJECTION, SOLUTION INTRAVENOUS at 12:26:00

## 2025-01-13 RX ADMIN — KETAMINE HYDROCHLORIDE 20 MG: 50 INJECTION, SOLUTION INTRAMUSCULAR; INTRAVENOUS at 12:16:00

## 2025-01-13 RX ADMIN — ONDANSETRON 4 MG: 2 INJECTION INTRAMUSCULAR; INTRAVENOUS at 12:30:00

## 2025-01-13 RX ADMIN — KETAMINE HYDROCHLORIDE 10 MG: 50 INJECTION, SOLUTION INTRAMUSCULAR; INTRAVENOUS at 12:45:00

## 2025-01-13 RX ADMIN — KETAMINE HYDROCHLORIDE 10 MG: 50 INJECTION, SOLUTION INTRAMUSCULAR; INTRAVENOUS at 13:40:00

## 2025-01-13 RX ADMIN — SODIUM CHLORIDE, SODIUM LACTATE, POTASSIUM CHLORIDE, CALCIUM CHLORIDE: 600; 310; 30; 20 INJECTION, SOLUTION INTRAVENOUS at 14:10:00

## 2025-01-13 RX ADMIN — KETAMINE HYDROCHLORIDE 10 MG: 50 INJECTION, SOLUTION INTRAMUSCULAR; INTRAVENOUS at 13:15:00

## 2025-01-13 RX ADMIN — ROCURONIUM BROMIDE 50 MG: 10 INJECTION, SOLUTION INTRAVENOUS at 12:16:00

## 2025-01-13 NOTE — OPERATIVE REPORT
OPERATIVE REPORT    Facility: [facility]  Patient name: Shea Mixon  : 1950        Medical record: [id number]  Date of procedure: 2025  Pre-operative diagnosis: Left knee end-stage degenerative joint disease  Post-operative diagnosis: Same  Procedure performed: Left total knee arthroplasty  Implants: [employer] TKA   Femur: size 4 Persona narrow  CR   Tibial tray: size C Persona with 30 mm short stem    Patella - 29 mm   Bearing - 12 mm Medial Congruent  Surgeon: Richard Ahumada M.D.   Assistant(s):  1. Amilcar Rabago, MSN, FNP-BC   2. Dwayne West SA  Anesthesia: general/Adductor Canal Femoral Nerve Block  Estimated blood loss: 150 milliliters   Drains: none  Specimens: None  Complications: None apparent            INDICATIONS:  Shea Mixon is a pleasant 74 year old year old who presented to my office with a long history of knee pain. The patient failed conservative therapy and we discussed surgical treatment options including total knee arthroplasty. Prior to surgery we discussed the risks, benefits, alternatives to surgery, and surgical convalescence.  Surgical consent was obtained both in the office, as well as the day of surgery. Risks of the procedure include, but are not limited to, infection, DVT, PE, damage to nerves or blood vessels at the time of surgery, bleeding and blood loss, stiffness/arthrofibrosis, and risk of loosening or failure of the components requiring revision surgery. The patient expressed understanding and wished to proceed with the surgery. An informed consent form was signed and placed on the chart prior to coming to the Operating Room.       PROCEDURE: The patient was brought to the operating room and once obtaining satisfactory anesthesia was placed in the supine position. The knee was prepped and draped in the usual sterile fashion for a total knee replacement.     A tourniquet was in the room but not applied/inflated during the  procedure in an effort to diminish quadriceps pain/dysfunction post operatively.     A surgical timeout was held verifying the site, procedure, and that pre-operative antibiotics had been given.  A longitudinal incision was over the anterior aspect of the knee exposing the extensor mechanism. An arthrotomy was performed and the patella displaced laterally. The gross pathologic findings revealed severe degenerative arthritis.    Subperiosteal dissection was continued around the proximal medial tibia. The necessary soft tissue release was performed to correct the fixed angular deformity. Care was taken to protect and preserve the medial collateral ligament.       Following this, the drill was used to open the femoral canal. After over drilling the canal, the intramedullary femoral guide was seated and the distal femur was resected at the stated valgus angle.     The extramedullary tibial cutting guide was then placed and the proximal tibia was resected at the appropriate level perpendicular to the long axis of the tibia.    The epicondylar axis and AP axis were determined.  The femoral sizing guide was set at the appropriate degree of external rotation.  The femur was then sized and the appropriate component selected. The anterior and posterior condyles were then resected with the appropriately sized guide and oscillating saw.    With the knee at 90 degrees of flexion, laminar spacers were placed between the femur and tibia. The anterior cruciate ligament was excised. The posterior cruciate ligament was left intact.  A medial and lateral meniscectomy were performed. A mixture of 30cc of 0.5% ropivicaine, 10mg Morphine, 30mg toradol, 0.5mg of epinephrine, clonidine 1mL were injected into the knee joint capsule posteriorly while the lamina spreaders were in position and at the capsulotomy incision sites prior to closure for local anesthesia.     The flexion and extension gaps were checked with the appropriate sized  spacer and noted to be well balanced. At this time, the tibial cut was checked with the spacer block and the alignment flex.    The distal end of the femur was sculptured with the notch and chamfer cutting guide using an oscillating saw. The tibial fixation hole was created with the tibial template and broach. It was prepared for a short 30 mm stem as well for added stability given BMI.    The synovium was excised from around the patella, after which the patella thickness was measured with calipers.  The patella was then prepared with the patellar reaming system.  The appropriate sized patellar template was seated and the three fixation holes were created with a drill.      A bone plug was then shaped and place in the opening created for the intramedullary femoral guide.    The trial components were then placed and the knee was found to have proper alignment and was stable through a full range of flexion and extension. The trial components were removed and the bony surfaces were cleaned with pulsatile lavage and an antibiotic solution. The bone was then dried and the permanent components were cemented in a sequential fashion. The tibial component was cemented first.  Set in the proper position and rotation.  The tibial component was impacted into place, it was fully seated and excess cemented was removed.  The femoral component was then cemented in place followed by the patella. Excess cement was removed.     Once the cement was hardened the tibial articular surface was implanted. The knee was then reduced and found to have good flexion, full extension with good stability and proper alignment. The patella tracked central.    A dilute (0.35%) betadine lavage was placed in the arthrotomy site to soak the components for 3 minutes prior to wound closure. The solution was made by adding 17.5 ml of 10% povidone-iodine in 500 cc of normal saline, resulting in a 0.35% dilution. This solution was then removed from the knee  and the knee was copiously irrigated.    Hemostasis was obtained with electrocautery. The knee irrigated with pulsatile lavage and antibiotic solution followed by normal saline.     The arthrotomy was closed with #1 stratafix barbed suture. The subcutaneous tissue was closed in layers with # 0 and # 2-0 Vicryl interrupted sutures. The skin was closed with 3-0 stratfix barbed sutures and dermabond. A sterile compressive dressing was applied. The patient tolerated the procedure well, without complication, and was transferred to the recovery room in a stable condition. The sponge and instrument count was correct.      A surgical first assistant was required and necessary for the completion of this case to aid in manipulation and positioning of the extremity while the surgeon operated.  Their assistance was vital to the safety and success of the procedure.     Deep venous thrombosis prophylaxis will consist of aspirin twice daily according to CHEST guidelines.             ____________________________  Richard Ahumada M.D.

## 2025-01-13 NOTE — INTERVAL H&P NOTE
Pre-op Diagnosis: OSTEOARTHRITIS LEFT KNEE    The above referenced H&P was reviewed by Richard Ahumada MD on 1/13/2025, the patient was examined and no significant changes have occurred in the patient's condition since the H&P was performed.  I discussed with the patient and/or legal representative the potential benefits, risks and side effects of this procedure; the likelihood of the patient achieving goals; and potential problems that might occur during recuperation.  I discussed reasonable alternatives to the procedure, including risks, benefits and side effects related to the alternatives and risks related to not receiving this procedure.  We will proceed with procedure as planned.

## 2025-01-13 NOTE — ANESTHESIA PREPROCEDURE EVALUATION
PRE-OP EVALUATION    Patient Name: Shea Mixon    Admit Diagnosis: OSTEOARTHRITIS LEFT KNEE    Pre-op Diagnosis: OSTEOARTHRITIS LEFT KNEE    LEFT TOTAL KNEE ARTHROPLASTY    Anesthesia Procedure: LEFT TOTAL KNEE ARTHROPLASTY (Left)    Surgeons and Role:     * Richard Ahumada MD - Primary    Pre-op vitals reviewed.  Temp: 98.5 °F (36.9 °C)  Pulse: 75  Resp: 16  BP: 138/77  SpO2: 94 %  Body mass index is 40.36 kg/m².    Current medications reviewed.  Hospital Medications:   [Transfer Hold] acetaminophen (Tylenol Extra Strength) tab 1,000 mg  1,000 mg Oral Once    [Transfer Hold] glucose (Dex4) 15 GM/59ML oral liquid 15 g  15 g Oral Q15 Min PRN    Or    [Transfer Hold] glucose (Glutose) 40% oral gel 15 g  15 g Oral Q15 Min PRN    Or    [Transfer Hold] glucose-vitamin C (Dex-4) chewable tab 4 tablet  4 tablet Oral Q15 Min PRN    Or    [Transfer Hold] dextrose 50% injection 50 mL  50 mL Intravenous Q15 Min PRN    Or    [Transfer Hold] glucose (Dex4) 15 GM/59ML oral liquid 30 g  30 g Oral Q15 Min PRN    Or    [Transfer Hold] glucose (Glutose) 40% oral gel 30 g  30 g Oral Q15 Min PRN    Or    [Transfer Hold] glucose-vitamin C (Dex-4) chewable tab 8 tablet  8 tablet Oral Q15 Min PRN    lactated ringers infusion   Intravenous Continuous    [Transfer Hold] tranexamic acid in sodium chloride 0.7% (Cyklokapron) 1000 mg/100mL infusion premix 1,000 mg  1,000 mg Intravenous Once    ceFAZolin (Ancef) 2g in 10mL IV syringe premix  2 g Intravenous Once    povidone-iodine (Betadine) 10 % 17.5 mL in sodium chloride 0.9 % 500 mL irrigation   Irrigation Once (Intra-Op)    clonidine/epinephrine/ropivacaine/ketorolac in 0.9% NaCl 60 mL pain cocktail syringe for knee arthroplasty   Infiltration Once (Intra-Op)       Outpatient Medications:   Prescriptions Prior to Admission[1]    Allergies: Patient has no known allergies.      Anesthesia Evaluation    Patient summary reviewed.    Anesthetic Complications  (+) history of anesthetic  complications  History of: PONV       GI/Hepatic/Renal      (+) GERD and well controlled         (+) liver disease                 Cardiovascular                  (+) hypertension                                     Endo/Other      (+) diabetes and well controlled, type 2,                   (+) arthritis       Pulmonary      (+) asthma              (+) sleep apnea and CPAP      Neuro/Psych      (+) depression                                Past Surgical History:   Procedure Laterality Date    Carpal tunnel release      [place]    Knee replacement surgery  2014    right          Tonsillectomy       Social History     Socioeconomic History    Marital status:    Tobacco Use    Smoking status: Never    Smokeless tobacco: Never   Vaping Use    Vaping status: Never Used   Substance and Sexual Activity    Alcohol use: Yes     Comment: SOCIALLY    Drug use: Never     History   Drug Use Unknown     Available pre-op labs reviewed.               Airway      Mallampati: II  Mouth opening: 3 FB  TM distance: > 6 cm  Neck ROM: full Cardiovascular    Cardiovascular exam normal.         Dental    Dentition appears grossly intact         Pulmonary    Pulmonary exam normal.                 Other findings              ASA: 3   Plan: general  NPO status verified and patient meets guidelines.    Post-procedure pain management plan discussed with surgeon and patient.      Plan/risks discussed with: patient, spouse and child/children                Present on Admission:  **None**             [1]   Medications Prior to Admission   Medication Sig Dispense Refill Last Dose/Taking    divalproex  MG Oral Tablet 24 Hr Take 1 tablet (250 mg total) by mouth in the morning and 1 tablet (250 mg total) before bedtime.   2025 at  8:00 AM    mupirocin 2 % External Ointment Apply 1 Application topically 2 (two) times daily.   2025 Morning    rosuvastatin 10 MG Oral Tab Take 1 tablet (10 mg total) by mouth nightly.    1/12/2025 at  7:00 PM    fluticasone-salmeterol 500-50 MCG/ACT Inhalation Aerosol Powder, Breath Activated Inhale 1 puff into the lungs 2 (two) times daily. inhale 1 puff by INHALATION route 2 times every day morning and evening approximately 12 hours apart 1 each 5 1/13/2025 at  8:00 AM    losartan-hydroCHLOROthiazide 50-12.5 MG Oral Tab Take 1 tablet by mouth daily.   1/12/2025 at  8:00 AM    Esomeprazole Magnesium 40 MG Oral Capsule Delayed Release Take 1 capsule (40 mg total) by mouth every morning before breakfast.   1/12/2025 at  7:00 PM    Magnesium Oxide (MAGNESIUM EXTRA STRENGTH OR) Take 1 tablet by mouth daily.   12/30/2024    metFORMIN HCl 1000 MG Oral Tab Take 1 tablet (1,000 mg total) by mouth 2 (two) times daily with meals.   12/30/2024    Trospium Chloride 20 MG Oral Tab Take 1 tablet (20 mg total) by mouth 2 (two) times daily.   1/12/2025 at  7:00 PM    carvedilol 12.5 MG Oral Tab Take 1 tablet (12.5 mg total) by mouth 2 (two) times daily with meals.   1/13/2025 at  8:00 AM    zolpidem 5 MG Oral Tab Take 2 tablets (10 mg total) by mouth nightly as needed for Sleep. 30 tablet 5 Past Month    ALBUTEROL 108 (90 Base) MCG/ACT Inhalation Aero Soln INHALE 2 PUFFS BY MOUTH EVERY 4-6 HOURS AS NEEDED 8.5 each 6 Past Month    SUMATRIPTAN SUCCINATE 100 MG Oral Tab TAKE 1 TABLET BY MOUTH EVERY 2 HOURS AS NEEDED FOR MIGRAINE. 12 tablet 5 Past Month    Pramipexole Dihydrochloride 0.25 MG Oral Tab Take 2 tablets (0.5 mg total) by mouth nightly. 180 tablet 3 1/12/2025 at  7:00 PM    traMADol HCl 50 MG Oral Tab Take 1 tablet (50 mg total) by mouth every 6 (six) hours as needed for Pain.   Past Month    folic acid 1 MG Oral Tab Take 1 tablet (1 mg total) by mouth daily.   1/13/2025 at  8:00 AM    Probiotic Product (PROBIOTIC-10) Oral Chew Tab Chew 1 capsule by mouth daily.   12/30/2024    Multiple Vitamins-Minerals (MULTIVITAMIN ADULT EXTRA C OR) Take 1 tablet by mouth daily.   12/30/2024    cholecalciferol 1000 UNITS  Oral Cap Take 1 capsule (1,000 Units total) by mouth daily.   12/30/2024    Ascorbic Acid (VITAMIN C) 500 MG Oral Cap Take 1 tablet by mouth daily.   12/30/2024    Glucosamine Sulfate 500 MG Oral Tab Take 1 tablet by mouth daily.   12/30/2024    methotrexate 2.5 MG Oral Tab Take 4 tablets (10 mg total) by mouth every 7 days.   1/9/2025    acetaminophen 325 MG Oral Tab Take 2 tablets (650 mg total) by mouth every 4 (four) hours. Post op       celecoxib 200 MG Oral Cap Take 1 capsule (200 mg total) by mouth daily. Post op       cephALEXin 500 MG Oral Cap Take 1 capsule (500 mg total) by mouth 2 (two) times daily. Post op       sennosides-docusate 8.6-50 MG Oral Tab Take 1 tablet by mouth daily. Post op       oxyCODONE 5 MG Oral Tab Take 1 tablet (5 mg total) by mouth every 4 (four) hours as needed for Pain. Post op       Multiple Vitamin (MULTIVITAMINS) Oral Cap Take 1 capsule by mouth daily.   12/30/2024    OneTouch Delica Lancets 33G Does not apply Misc Use to test glucose once daily as directed 100 each 0     Glucose Blood (ONETOUCH ULTRA) In Vitro Strip Use to test glucose once daily as directed 100 each 0 12/30/2024    ONETOUCH LANCETS Does not apply Misc Test accucheck daily  each 3     Glucose Blood (ONETOUCH ULTRA BLUE) In Vitro Strip Use to test glucose at home daily prn 100 strip 3     ACCU-CHEK SHADE PLUS In Vitro Strip        Accu-Chek Multiclix Lancets Does not apply Misc

## 2025-01-13 NOTE — ANESTHESIA PROCEDURE NOTES
Airway  Date/Time: 1/13/2025 12:18 PM  Urgency: elective      General Information and Staff    Patient location during procedure: OR  Anesthesiologist: Alex Guerra MD  Performed: anesthesiologist   Performed by: Alex Guerra MD  Authorized by: Alex Guerra MD      Indications and Patient Condition  Indications for airway management: anesthesia  Sedation level: deep  Preoxygenated: yes  Patient position: sniffing  Mask difficulty assessment: 2 - vent by mask + OA or adjuvant +/- NMBA    Final Airway Details  Final airway type: endotracheal airway      Successful airway: ETT  Cuffed: yes   Successful intubation technique: direct laryngoscopy  Endotracheal tube insertion site: oral  Blade: Stephanie  Blade size: #3  ETT size (mm): 7.0    Cormack-Lehane Classification: grade I - full view of glottis  Placement verified by: capnometry   Measured from: lips  ETT to lips (cm): 20  Number of attempts at approach: 1

## 2025-01-13 NOTE — ANESTHESIA PROCEDURE NOTES
Regional Block    Date/Time: 1/13/2025 12:20 PM    Performed by: Alex Guerra MD  Authorized by: Alex Guerra MD      General Information and Staff    Start Time:  1/13/2025 12:20 PM  End Time:  1/13/2025 12:25 PM  Anesthesiologist:  Alex Guerra MD  Performed by:  Anesthesiologist  Patient Location:  OR      Site Identification: real time ultrasound guided and image stored and retrievable    Block site/laterality marked before start: site marked  Reason for Block: at surgeon's request and post-op pain management    Preanesthetic Checklist: 2 patient identifers, IV checked, risks and benefits discussed, monitors and equipment checked, pre-op evaluation, timeout performed, anesthesia consent, sterile technique used, no prohibitive neurological deficits and no local skin infection at insertion site      Procedure Details    Patient Position:  Supine  Prep: ChloraPrep    Monitoring:  Cardiac monitor, continuous pulse ox, blood pressure cuff and heart rate  Block Type:  Adductor canal  Laterality:  Left  Injection Technique:  Single-shot    Needle    Needle Type:  Short-bevel and echogenic  Needle Gauge:  21 G  Needle Length:  100 mm  Needle Localization:  Ultrasound guidance  Reason for Ultrasound Use: appropriate spread of the medication was noted in real time and no ultrasound evidence of intravascular and/or intraneural injection            Assessment    Injection Assessment:  Good spread noted, negative resistance, negative aspiration for heme, incremental injection and low pressure  Heart Rate Change: No    - Patient tolerated block procedure well without evidence of immediate block related complications.     Medications  1/13/2025 12:20 PM      Additional Comments    Medication:  Bupivacaine 0.25% 25mL with 1:200,000 epi

## 2025-01-13 NOTE — ANESTHESIA POSTPROCEDURE EVALUATION
[facility]    Shea Mixon Patient Status:  Outpatient in a Bed   Age/Gender 74 year old female MRN [id number]   Location [facility] SURGERY Attending Richard Ahumada MD   Hosp Day # 0 PCP No primary care provider on file.       Anesthesia Post-op Note    LEFT TOTAL KNEE ARTHROPLASTY    Procedure Summary       Date: 01/13/25 Room / Location:  MAIN OR 19 / [facility] MAIN OR    Anesthesia Start: 1211 Anesthesia Stop: 1410    Procedure: LEFT TOTAL KNEE ARTHROPLASTY (Left: Knee) Diagnosis: (OSTEOARTHRITIS LEFT KNEE)    Surgeons: Richard Ahumada MD Anesthesiologist: Alex Guerra MD    Anesthesia Type: general ASA Status: 3            Anesthesia Type: general    Vitals Value Taken Time   /77 01/13/25 1408   Temp 97.7 01/13/25 1410   Pulse 75 01/13/25 1410   Resp 19 01/13/25 1410   SpO2 99 % 01/13/25 1410   Vitals shown include unfiled device data.        Patient Location: PACU    Anesthesia Type: general    Airway Patency: patent    Postop Pain Control: adequate    Mental Status: preanesthetic baseline    Nausea/Vomiting: none    Cardiopulmonary/Hydration status: stable euvolemic    Complications: no apparent anesthesia related complications    Postop vital signs: stable    Dental Exam: Unchanged from Preop    Patient to be discharged from PACU when criteria met.

## 2025-01-14 ENCOUNTER — TELEPHONE (OUTPATIENT)
Facility: CLINIC | Age: 75
End: 2025-01-14

## 2025-01-14 VITALS
TEMPERATURE: 98 F | RESPIRATION RATE: 16 BRPM | OXYGEN SATURATION: 94 % | HEIGHT: 57 IN | WEIGHT: 186.5 LBS | BODY MASS INDEX: 40.23 KG/M2 | SYSTOLIC BLOOD PRESSURE: 128 MMHG | DIASTOLIC BLOOD PRESSURE: 57 MMHG | HEART RATE: 78 BPM

## 2025-01-14 LAB
ANION GAP SERPL CALC-SCNC: 7 MMOL/L (ref 0–18)
BASOPHILS # BLD AUTO: 0.04 X10(3) UL (ref 0–0.2)
BASOPHILS NFR BLD AUTO: 0.2 %
BUN BLD-MCNC: 15 MG/DL (ref 9–23)
CALCIUM BLD-MCNC: 8.8 MG/DL (ref 8.7–10.6)
CHLORIDE SERPL-SCNC: 103 MMOL/L (ref 98–112)
CO2 SERPL-SCNC: 25 MMOL/L (ref 21–32)
CREAT BLD-MCNC: 0.63 MG/DL
EGFRCR SERPLBLD CKD-EPI 2021: 94 ML/MIN/1.73M2 (ref 60–?)
EOSINOPHIL # BLD AUTO: 0.03 X10(3) UL (ref 0–0.7)
EOSINOPHIL NFR BLD AUTO: 0.2 %
ERYTHROCYTE [DISTWIDTH] IN BLOOD BY AUTOMATED COUNT: 13.5 %
GLUCOSE BLD-MCNC: 116 MG/DL (ref 70–99)
GLUCOSE BLD-MCNC: 151 MG/DL (ref 70–99)
GLUCOSE BLD-MCNC: 155 MG/DL (ref 70–99)
HCT VFR BLD AUTO: 33 %
HGB BLD-MCNC: 11.2 G/DL
IMM GRANULOCYTES # BLD AUTO: 0.1 X10(3) UL (ref 0–1)
IMM GRANULOCYTES NFR BLD: 0.5 %
LYMPHOCYTES # BLD AUTO: 1.87 X10(3) UL (ref 1–4)
LYMPHOCYTES NFR BLD AUTO: 9.5 %
MCH RBC QN AUTO: 31.1 PG (ref 26–34)
MCHC RBC AUTO-ENTMCNC: 33.9 G/DL (ref 31–37)
MCV RBC AUTO: 91.7 FL
MONOCYTES # BLD AUTO: 1.31 X10(3) UL (ref 0.1–1)
MONOCYTES NFR BLD AUTO: 6.6 %
NEUTROPHILS # BLD AUTO: 16.38 X10 (3) UL (ref 1.5–7.7)
NEUTROPHILS # BLD AUTO: 16.38 X10(3) UL (ref 1.5–7.7)
NEUTROPHILS NFR BLD AUTO: 83 %
OSMOLALITY SERPL CALC.SUM OF ELEC: 284 MOSM/KG (ref 275–295)
PLATELET # BLD AUTO: 251 10(3)UL (ref 150–450)
POTASSIUM SERPL-SCNC: 3.6 MMOL/L (ref 3.5–5.1)
RBC # BLD AUTO: 3.6 X10(6)UL
SODIUM SERPL-SCNC: 135 MMOL/L (ref 136–145)
WBC # BLD AUTO: 19.7 X10(3) UL (ref 4–11)

## 2025-01-14 PROCEDURE — 97535 SELF CARE MNGMENT TRAINING: CPT

## 2025-01-14 PROCEDURE — 97165 OT EVAL LOW COMPLEX 30 MIN: CPT

## 2025-01-14 PROCEDURE — 82962 GLUCOSE BLOOD TEST: CPT

## 2025-01-14 PROCEDURE — 94640 AIRWAY INHALATION TREATMENT: CPT

## 2025-01-14 PROCEDURE — 85025 COMPLETE CBC W/AUTO DIFF WBC: CPT | Performed by: INTERNAL MEDICINE

## 2025-01-14 PROCEDURE — 97161 PT EVAL LOW COMPLEX 20 MIN: CPT

## 2025-01-14 PROCEDURE — 80048 BASIC METABOLIC PNL TOTAL CA: CPT | Performed by: INTERNAL MEDICINE

## 2025-01-14 PROCEDURE — 97530 THERAPEUTIC ACTIVITIES: CPT

## 2025-01-14 PROCEDURE — 97116 GAIT TRAINING THERAPY: CPT

## 2025-01-14 RX ORDER — LOSARTAN POTASSIUM 50 MG/1
50 TABLET ORAL DAILY
Status: DISCONTINUED | OUTPATIENT
Start: 2025-01-14 | End: 2025-01-14

## 2025-01-14 NOTE — ADDENDUM NOTE
Addendum  created 01/13/25 1830 by Alex Guerra MD    Clinical Note Signed, Intraprocedure Blocks edited

## 2025-01-14 NOTE — PROGRESS NOTES
Progress Note    Patient: Shea Mixon  Medical record #: [id number]    Shea Mixon is a 74 year old  female who is POD #1 left TKA.  The patient's main complaint today is surgical pain at the operative site. No numbness or tingling. No chest pain or shortness of breath.    Hospital Problem List:  Active Problems:    * No active hospital problems. *      Current Medications:   losartan (Cozaar) tab 50 mg  50 mg Oral Daily    lactated ringers infusion   Intravenous Continuous    [COMPLETED] ceFAZolin (Ancef) 2g in 10mL IV syringe premix  2 g Intravenous Once    sodium chloride 0.9 % IV bolus 500 mL  500 mL Intravenous Once PRN    sennosides (Senokot) tab 17.2 mg  17.2 mg Oral Nightly    docusate sodium (Colace) cap 100 mg  100 mg Oral BID    polyethylene glycol (PEG 3350) (Miralax) 17 g oral packet 17 g  17 g Oral Daily PRN    magnesium hydroxide (Milk of Magnesia) 400 MG/5ML oral suspension 30 mL  30 mL Oral Daily PRN    bisacodyl (Dulcolax) 10 MG rectal suppository 10 mg  10 mg Rectal Daily PRN    fleet enema (Fleet) rectal enema 133 mL  1 enema Rectal Once PRN    ondansetron (Zofran) 4 MG/2ML injection 4 mg  4 mg Intravenous Q6H PRN    metoclopramide (Reglan) 5 mg/mL injection 10 mg  10 mg Intravenous Q8H PRN    [] diphenhydrAMINE (Benadryl) 50 mg/mL  injection 25 mg  25 mg Intravenous Once PRN    diphenhydrAMINE (Benadryl) cap/tab 25 mg  25 mg Oral Q4H PRN    Or    diphenhydrAMINE (Benadryl) 50 mg/mL  injection 12.5 mg  12.5 mg Intravenous Q4H PRN    ferrous sulfate DR tab 325 mg  325 mg Oral Daily with breakfast    aspirin DR tab 81 mg  81 mg Oral BID    [COMPLETED] ceFAZolin (Ancef) 2g in 10mL IV syringe premix  2 g Intravenous Q8H    [COMPLETED] acetaminophen (Tylenol) tab 650 mg  650 mg Oral Once    tiZANidine (Zanaflex) partial tab 1 mg  1 mg Oral Q6H PRN    dexAMETHasone PF (Decadron) 10 MG/ML injection 8 mg  8 mg Intravenous Once    acetaminophen (Tylenol) tab 650 mg  650 mg Oral Q4H  While awake    ketorolac (Toradol) 30 MG/ML injection 15 mg  15 mg Intravenous Q6H    Followed by    ibuprofen (Motrin) tab 600 mg  600 mg Oral 4 times per day    oxyCODONE immediate release partial tab 2.5 mg  2.5 mg Oral Q4H PRN    Or    oxyCODONE immediate release tab 5 mg  5 mg Oral Q4H PRN    HYDROmorphone (Dilaudid) 1 MG/ML injection 0.2 mg  0.2 mg Intravenous Q2H PRN    Or    HYDROmorphone (Dilaudid) 1 MG/ML injection 0.4 mg  0.4 mg Intravenous Q2H PRN    albuterol (Ventolin HFA) 108 (90 Base) MCG/ACT inhaler 2 puff  2 puff Inhalation Q6H PRN    carvedilol (Coreg) tab 12.5 mg  12.5 mg Oral BID with meals    divalproex ER (Depakote ER) 24 hr tab 250 mg  250 mg Oral BID    pantoprazole (Protonix) DR tab 40 mg  40 mg Oral QAM AC    fluticasone-salmeterol (Advair Diskus) 500-50 MCG/ACT inhaler 1 puff  1 puff Inhalation BID    folic acid (Folvite) tab 1 mg  1 mg Oral Daily    pramipexole (Mirapex) tab 0.5 mg  0.5 mg Oral Nightly    rosuvastatin (Crestor) tab 10 mg  10 mg Oral Nightly    SUMAtriptan (Imitrex) tab 100 mg  100 mg Oral Q2H PRN    oxybutynin ER (Ditropan-XL) 24 hr tab 5 mg  5 mg Oral Daily    glucose (Dex4) 15 GM/59ML oral liquid 15 g  15 g Oral Q15 Min PRN    Or    glucose (Glutose) 40% oral gel 15 g  15 g Oral Q15 Min PRN    Or    glucose-vitamin C (Dex-4) chewable tab 4 tablet  4 tablet Oral Q15 Min PRN    Or    dextrose 50% injection 50 mL  50 mL Intravenous Q15 Min PRN    Or    glucose (Dex4) 15 GM/59ML oral liquid 30 g  30 g Oral Q15 Min PRN    Or    glucose (Glutose) 40% oral gel 30 g  30 g Oral Q15 Min PRN    Or    glucose-vitamin C (Dex-4) chewable tab 8 tablet  8 tablet Oral Q15 Min PRN    insulin aspart (NovoLOG) 100 Units/mL FlexPen 1-5 Units  1-5 Units Subcutaneous TID AC and HS    [COMPLETED] povidone-iodine (Betadine) 10 % 17.5 mL in sodium chloride 0.9 % 500 mL irrigation   Irrigation Once (Intra-Op)    [COMPLETED] clonidine/epinephrine/ropivacaine/ketorolac in 0.9% NaCl 60 mL pain  cocktail syringe for knee arthroplasty   Infiltration Once (Intra-Op)         Input/Output:    Intake/Output Summary (Last 24 hours) at 1/14/2025 0914  Last data filed at 1/13/2025 1900  Gross per 24 hour   Intake 770 ml   Output 650 ml   Net 120 ml       Vital signs:  Blood pressure 102/70, pulse 75, temperature 98.2 °F (36.8 °C), temperature source Oral, resp. rate 18, height 4' 9\" (1.448 m), weight 186 lb 8.2 oz (84.6 kg), SpO2 93%.    Physical Exam:     left Knee:  Dressing: clean and dry  Able to dorsiflex ankle and move toes  Sensation grossly intact to light touch throughout  Distal pulses intact  No calf swelling  Albert's sign negative  Compartments soft  No signs or symptoms of DVT or compartment syndrome    Labs:          Assessment: 74 year old  female POD #1 left TKA    Plan:  mobilize with PT, WBAT on operative knee with walker   Augustina-op Glucose Goal control <140  pain controlled   Ice and elevation  TEDs, SCDS, IS  ASA 81 mg BID for 6 weeks according to CHEST guidelines and bleeding risks.    Disposition: plan discharge home POD #1 or 2 if doing well with PT; if slow to progress consider transfer to rehab   Leave Dressing in place for one week once aquacel in place.  OK to switch dressing for >50% saturated.  Ok to shower with it on.    Follow up with Dr. Ahumada - 2-3 weeks after discharge home or 2-3 weeks after discharge from rehab  Appreciate medical team's expertise and help in managing the patient's medical co-morbidities    Followed by [facility] for medical conditions to include Active Problems:    * No active hospital problems. *        Signed:  Richard Ahumada MD  1/14/2025  9:28 AM

## 2025-01-14 NOTE — PHYSICAL THERAPY NOTE
PHYSICAL THERAPY EVALUATION - INPATIENT     Room Number: 352/352-A  Evaluation Date: 1/14/2025  Type of Evaluation: Initial  Physician Order: PT Eval and Treat    Presenting Problem: L TKA  Co-Morbidities : depression, RA, asthma, HLD, HTN  Reason for Therapy: Mobility Dysfunction and Discharge Planning    Procedure performed: Left total knee arthroplasty  Surgeon: Richard Ahumada M.D    PHYSICAL THERAPY ASSESSMENT   Patient is a 74 year old female admitted 1/13/2025 for L TKA.   Patient is currently functioning near baseline with bed mobility, transfers, gait, and stair negotiation. Prior to admission, patient's baseline is modified independent.     Patient will benefit from continued skilled PT Services at discharge to promote prior level of function and safety with additional support and return home with home health PT.    PLAN  Patient has been evaluated and presents with no skilled Physical Therapy needs at this time.  Patient discharged from Physical Therapy services.  Please re-order if a new functional limitation presents during this admission.    PT Device Recommendation: Rolling walker    GOALS  Patient was able to achieve the following goals ...    Patient was able to transfer Safely with RW   Patient able to ambulate on level surfaces Safely with RW     HOME SITUATION  Type of Home: House  Home Layout: Two level  Stairs to Enter : 2   Railing: No    Stairs to Bedroom: 13    Railing: Yes    Lives With: Spouse;Daughter    Drives: Yes   Patient Regularly Uses: Glasses;Cane;Hearing aides     Prior Level of [place]: Pt lives in a two story house with 2 curb steps to enter without a rail. There are 13 steps to the second floor with B rail for half, one rail for the remaining. Pt lives with her  and daughter. Pt previously used a can but owns a 4-pt walker. Pt drives, wears glasses and hearing aides. Pt was previously independent prior.    SUBJECTIVE  \"It feels good to be  up\"    OBJECTIVE  Precautions: Bed/chair alarm  Fall Risk: Standard fall risk    WEIGHT BEARING RESTRICTION  L Lower Extremity: Weight Bearing as Tolerated    PAIN ASSESSMENT  Ratin  Location: pt denies pain at this time       COGNITION  Overall Cognitive Status:  WFL - within functional limits    RANGE OF MOTION AND STRENGTH ASSESSMENT  Upper extremity ROM and strength are within functional limits   LEFT Lower extremity ROM is limited as expected s/p L TKA   LEFT Lower extremity strength is  limited as expected s/p L TKA    BALANCE  Static Sitting: Normal  Dynamic Sitting: Good  Static Standing: Fair -  Dynamic Standing: Fair -    ADDITIONAL TESTS                                    ACTIVITY TOLERANCE                         O2 WALK       NEUROLOGICAL FINDINGS                        AM-PAC '6-Clicks' INPATIENT SHORT FORM - BASIC MOBILITY  How much difficulty does the patient currently have...  Patient Difficulty: Turning over in bed (including adjusting bedclothes, sheets and blankets)?: A Little   Patient Difficulty: Sitting down on and standing up from a chair with arms (e.g., wheelchair, bedside commode, etc.): A Little   Patient Difficulty: Moving from lying on back to sitting on the side of the bed?: A Little   How much help from another person does the patient currently need...   Help from Another: Moving to and from a bed to a chair (including a wheelchair)?: A Little   Help from Another: Need to walk in hospital room?: A Little   Help from Another: Climbing 3-5 steps with a railing?: A Little       AM-PAC Score:  Raw Score: 18   Approx Degree of Impairment: 46.58%   Standardized Score (AM-PAC Scale): 43.63   CMS Modifier (G-Code): CK    FUNCTIONAL ABILITY STATUS  Gait Assessment   Functional Mobility/Gait Assessment  Gait Assistance: Contact guard assist  Distance (ft): 150  Assistive Device: Rolling walker  Pattern: Shuffle;L Decreased stance time  Stairs: Stairs;Stoop/curb  How Many Stairs: 4  Device:  2 Rails  Assist: Contact guard assist  Pattern: Ascend and Descend  Ascend and Descend : Step to  Stoop/Curb: curb x 2 with RW, verbal cues for RW placement, up with the good, down with the bad    Skilled Therapy Provided   Educated on continued out of bed mobility and ambulation   Educated on use of ice to manage pain and swelling  Educated on weight bearing as tolerated precautions  Educated on HEP for strengthening and ROM  Educated on not using pillow behind the knee to maintain total knee extension    Sit to stand> ambulated 75 feet with RW> therapeutic rest break> curb step x2  with RW> 4 stairs with B rail> ambulated 75 feet with RW> up in chair> educated on HEP> upright in chair at end of session    Bed Mobility:  Rolling:  supervision  Supine to sit: NT  Sit to supine: NT     Transfer Mobility:  Sit to stand: CGA to RW   Stand to sit: supervision with VC for hand placement  Gait = CGA for 75,75 with RW. Decreased shania, decreased step length, decreased stance time, shuffled gait pattern.    Therapist's comments: RN gave clearance to see patient. Discussed role and goal of physical therapy in hospital setting. Pt in agreement to session.       Exercise/Education Provided:  Bed mobility  Body mechanics  Energy conservation  Functional activity tolerated  Gait training  Posture  ROM  Strengthening  Transfer training    Patient End of Session: Up in chair;Needs met;Call light within reach;RN aware of session/findings;All patient questions and concerns addressed;Hospital anti-slip socks;SCDs in place;Ice applied;Alarm set;Discussed recommendations with /    Patient Evaluation Complexity Level:  History Low - no personal factors and/or co-morbidities   Examination of body systems Low -  addressing 1-2 elements   Clinical Presentation Low- Stable   Clinical Decision Making Low Complexity       PT Session Time: 35 minutes  Gait Training: 15 minutes  Therapeutic Activity: 10 minutes

## 2025-01-14 NOTE — TELEPHONE ENCOUNTER
Received a fax from [facility] that they have supplied a temporary supply to pt for Fluticasone Salmeterol 500-50.  Left message for pt to discuss.

## 2025-01-14 NOTE — CM/SW NOTE
01/14/25 1000   CM/SW Referral Data   Referral Source Social Work (self-referral)   Reason for Referral Discharge planning   Informant EMR;Clinical Staff Member   Discharge Needs   Anticipated D/C needs Home health care       Patient is a 75 y/o woman admitted s/p TKR.  Pt with pre-operative plan for [facility] at [place].  PT recommending [facility] services at discharge.  Melinda from [facility] confirmed pt accepted for [facility] services at [place].  No other [place] needs/concerns identified at this time.  / to remain available for support and/or discharge planning.     Wendy Sheehan, [facility]  Discharge Planner  [phone]

## 2025-01-14 NOTE — OCCUPATIONAL THERAPY NOTE
OCCUPATIONAL THERAPY EVALUATION - INPATIENT    Room Number: 352/352-A  Evaluation Date: 1/14/2025     Type of Evaluation: Initial  Presenting Problem: s/p L TKA 1/13/25    Physician Order: IP Consult to Occupational Therapy  Reason for Therapy:  ADL/IADL Dysfunction and Discharge Planning    OCCUPATIONAL THERAPY ASSESSMENT   Patient is a 74 year old female admitted on 1/13/2025 with Presenting Problem: s/p L TKA 1/13/25. Co-Morbidities : depression, RA, asthma, HLD, HTN  Patient is currently functioning near baseline with ADLs and functional transfers.  Prior to admission, patient's baseline is independent.  Patient met all OT goals at supervision to Mod I level.  Patient reports no further questions/concerns at this time.     Patient will benefit from continued skilled OT Services with no additional skilled services but increased support at home    Recommendations for nursing staff:   Transfers: 1-person  Toileting location: Toilet    EVALUATION SESSION:  Patient at start of session: chair    FUNCTIONAL TRANSFER ASSESSMENT  Sit to Stand: Edge of Bed; Chair  Edge of Bed: Supervision  Chair: Supervision  Toilet Transfer: Supervision (standard height, light use of grab bar, reports vanity adjacent to both toilets at home)    BED MOBILITY  Supine to Sit : Modified Independent  Sit to Supine (OT): Modified Independent    BALANCE ASSESSMENT     FUNCTIONAL ADL ASSESSMENT  LB Dressing Seated: Supervision (with use of reacher for underwear/pants; slightly decreased reach noted for sock management,  reports will assist, educated on option of sock aide if needed)  LB Dressing Standing: Supervision  Toileting Seated: Supervision  Toileting Standing: Supervision    ACTIVITY TOLERANCE: WFL                         O2 SATURATIONS       COGNITION  Overall Cognitive Status:  WFL - within functional limits    COGNITION ASSESSMENTS     Upper Extremity:   ROM: within functional limits   Strength: is within functional limits      EDUCATION PROVIDED  Patient Education : Role of Occupational Therapy; Functional Transfer Techniques; Fall Prevention; Weight Bear Status; Posture/Positioning  Patient's Response to Education: Verbalized Understanding ( also present)    Equipment used: RW, reacher  Demonstrates functional use    Therapist comments: Patient motivated and participatory. Educated on safety precautions and incorporation into ADLs and transfers, followed with good return demo. Patient performed all ADLs and functional transfers at Supervision to Mod I level. Patient aware of recommendation for initial supervision at discharge, including supervision for shower transfers and increased assist with IADLs; patient reports will have initial supervision/assist as needed from  and adult son at discharge. Patient reports no further questions or concerns regarding discharge home to ADLs.     Patient End of Session: Up in chair;Needs met;Call light within reach;RN aware of session/findings;All patient questions and concerns addressed;Hospital anti-slip socks;Alarm set;Family present;With  staff    OCCUPATIONAL PROFILE    HOME SITUATION  Type of Home: House  Home Layout: Two level  Lives With: Spouse;Son    Toilet and Equipment: Standard height toilet;Comfort height toilet  Shower/Tub and Equipment: Walk-in shower;Grab bar;Shower chair  Other Equipment: Reacher;Long-handled sponge    Occupation/Status: retired     Drives: Yes  Patient Regularly Uses: Glasses;Cane;Hearing aides    Prior Level of Function: Patient reports independent in all I/ADL and functional mobility via cane prior to admission.    SUBJECTIVE  \"I have a reacher on every level of my house, and in my garage!\"    PAIN ASSESSMENT  Rating: Unable to rate  Location: \"sore\" at back of L knee  Management Techniques: Repositioning;Relaxation;Ice    OBJECTIVE  Precautions: Bed/chair alarm  Fall Risk: Standard fall risk    WEIGHT BEARING RESTRICTION  L Lower Extremity:  Weight Bearing as Tolerated      AM-PAC ‘6-Clicks’ Inpatient Daily Activity Short Form  -   Putting on and taking off regular lower body clothing?: A Little (supervision)  -   Bathing (including washing, rinsing, drying)?: A Little (supervision)  -   Toileting, which includes using toilet, bedpan or urinal? : A Little (supervision)  -   Putting on and taking off regular upper body clothing?: None  -   Taking care of personal grooming such as brushing teeth?: None  -   Eating meals?: None    AM-PAC Score:  Score: 21  Approx Degree of Impairment: 32.79%  Standardized Score (AM-PAC Scale): 44.27    ADDITIONAL TESTS     NEUROLOGICAL FINDINGS      PLAN   Patient has been evaluated and presents with no skilled Occupational Therapy needs at this time.  Patient discharged from Occupational Therapy services.  Please re-order if a new functional limitation presents during this admission.    OT Device Recommendations: Sock aid    Patient Evaluation Complexity Level:   Occupational Profile/Medical History LOW - Brief history including review of medical or therapy records    Specific performance deficits impacting engagement in ADL/IADL LOW  1 - 3 performance deficits    Client Assessment/Performance Deficits LOW - No comorbidities nor modifications of tasks    Clinical Decision Making LOW - Analysis of occupational profile, problem-focused assessments, limited treatment options    Overall Complexity LOW     OT Session Time: 25 minutes  Self-Care Home Management: 10 minutes

## 2025-01-14 NOTE — PLAN OF CARE
Patient alert and oriented x4. VSS on RA. Pain controlled with scheduled meds. Denies n/t. Aquacel dressing to left knee, CDI. Spanda  in place, gel ice at bedside. SCDs in place, ankle pumps encouraged, IS encouraged. Patient up x1 with the walker. Voiding freely in bathroom. Tolerating diet, no c/o n/v.     Plan: monitor BP, PT/OT, discharge home when cleared by all services

## 2025-01-14 NOTE — PROGRESS NOTES
Pt admitted to unit in stable condition. Left knee aquacel CDI. Ice and compression stocking to left knee. Denies pain. Tolerating general diet. IVF infusing. DTV. Plan of care reviewed with pt and family at the bedside. All questions addressed.

## 2025-01-14 NOTE — PROGRESS NOTES
AVS reviewed, IV dc'd, dc video viewed, bolus x 1 given earlier -cleared for dc , will dc home w/ [facility]

## 2025-01-14 NOTE — PROGRESS NOTES
NURSING DISCHARGE NOTE    Discharged Home via Wheelchair.  Accompanied by Support staff  Belongings Taken by patient/family.    Patient cleared for discharge by ortho, hospitalist, and PT/OT. IV removed. Pt watched educational video. Patient educated on all AVS discharge information by Olvin DELATORRE, all questions answered. Patient has meds at home. [facility] and paperwork to be driven home by spouse. Discharging with [facility].

## 2025-01-14 NOTE — DISCHARGE SUMMARY
[facility] Internal Medicine Hospitalist Discharge Summary     Patient ID:  Shea Mixon  74 year old  12/18/1950    Admit date: 1/13/2025    Discharge date: 1/14/25    Attending Physician: Richard Ahumada MD     Primary Care Physician: No primary care provider on file.     Discharge Diagnoses: L Knee OA s/p L TKA    Discharge Condition: stable    Disposition:  Home    Important Follow Ups:  - PCP: 1-2 weeks   - Consults: Ortho as recommended    Hospital Course:      A/P: 73 y/o F w/ PMHx of HTN, HLD, Moderate Asthma, Migraines, T2DM, Anemia who presents for a planned L TKA.     L Knee OA  S/p L TKA  POD1  Plan:  - Cleared for discharge by surgery team  - PRN pain medications     HTN  - Continue Coreg, Losartan, hold hydrochlorothiazide until am labs return  HLD  - Statin  Migraines  - Continue Depakote  LENA/Macrocytic Anemia  - Continue Ferrous Sulfate/Folic Acid  Moderate Asthma  - Continue Advair    Consults: IP CONSULT TO CASE MANAGEMENT  IP CONSULT TO HOSPITALIST  IP CONSULT TO RESPIRATORY CARE  IP CONSULT TO SOCIAL WORK    Operative Procedures: Procedure(s) (LRB):  LEFT TOTAL KNEE ARTHROPLASTY (Left)       Patient instructions:      I as the attending physician reconciled the current and discharge medications on day of discharge.     Current Discharge Medication List        CONTINUE these medications which have NOT CHANGED    Details   divalproex  MG Oral Tablet 24 Hr Take 1 tablet (250 mg total) by mouth in the morning and 1 tablet (250 mg total) before bedtime.      mupirocin 2 % External Ointment Apply 1 Application topically 2 (two) times daily.      aspirin (ASPIRIN EC LOW DOSE) 81 MG Oral Tab EC Take 1 tablet (81 mg total) by mouth 2 (two) times daily. To take for 6 weeks total to prevent blood clots.      rosuvastatin 10 MG Oral Tab Take 1 tablet (10 mg total) by mouth nightly.      fluticasone-salmeterol 500-50 MCG/ACT Inhalation  Aerosol Powder, Breath Activated Inhale 1 puff into the lungs 2 (two) times daily. inhale 1 puff by INHALATION route 2 times every day morning and evening approximately 12 hours apart      losartan-hydroCHLOROthiazide 50-12.5 MG Oral Tab Take 1 tablet by mouth daily.      Esomeprazole Magnesium 40 MG Oral Capsule Delayed Release Take 1 capsule (40 mg total) by mouth every morning before breakfast.      Magnesium Oxide (MAGNESIUM EXTRA STRENGTH OR) Take 1 tablet by mouth daily.      metFORMIN HCl 1000 MG Oral Tab Take 1 tablet (1,000 mg total) by mouth 2 (two) times daily with meals.      Trospium Chloride 20 MG Oral Tab Take 1 tablet (20 mg total) by mouth 2 (two) times daily.      carvedilol 12.5 MG Oral Tab Take 1 tablet (12.5 mg total) by mouth 2 (two) times daily with meals.      zolpidem 5 MG Oral Tab Take 2 tablets (10 mg total) by mouth nightly as needed for Sleep.      ALBUTEROL 108 (90 Base) MCG/ACT Inhalation Aero Soln INHALE 2 PUFFS BY MOUTH EVERY 4-6 HOURS AS NEEDED      SUMATRIPTAN SUCCINATE 100 MG Oral Tab TAKE 1 TABLET BY MOUTH EVERY 2 HOURS AS NEEDED FOR MIGRAINE.      Pramipexole Dihydrochloride 0.25 MG Oral Tab Take 2 tablets (0.5 mg total) by mouth nightly.      traMADol HCl 50 MG Oral Tab Take 1 tablet (50 mg total) by mouth every 6 (six) hours as needed for Pain.      folic acid 1 MG Oral Tab Take 1 tablet (1 mg total) by mouth daily.      Probiotic Product (PROBIOTIC-10) Oral Chew Tab Chew 1 capsule by mouth daily.      Multiple Vitamins-Minerals (MULTIVITAMIN ADULT EXTRA C OR) Take 1 tablet by mouth daily.      cholecalciferol 1000 UNITS Oral Cap Take 1 capsule (1,000 Units total) by mouth daily.      Ascorbic Acid (VITAMIN C) 500 MG Oral Cap Take 1 tablet by mouth daily.      Glucosamine Sulfate 500 MG Oral Tab Take 1 tablet by mouth daily.      methotrexate 2.5 MG Oral Tab Take 4 tablets (10 mg total) by mouth every 7 days.      acetaminophen 325 MG Oral Tab Take 2 tablets (650 mg total) by  mouth every 4 (four) hours. Post op      celecoxib 200 MG Oral Cap Take 1 capsule (200 mg total) by mouth daily. Post op      cephALEXin 500 MG Oral Cap Take 1 capsule (500 mg total) by mouth 2 (two) times daily. Post op      sennosides-docusate 8.6-50 MG Oral Tab Take 1 tablet by mouth daily. Post op      oxyCODONE 5 MG Oral Tab Take 1 tablet (5 mg total) by mouth every 4 (four) hours as needed for Pain. Post op      Multiple Vitamin (MULTIVITAMINS) Oral Cap Take 1 capsule by mouth daily.      !! OneTouch Delica Lancets 33G Does not apply Misc Use to test glucose once daily as directed      !! Glucose Blood (ONETOUCH ULTRA) In Vitro Strip Use to test glucose once daily as directed      !! ONETOUCH LANCETS Does not apply Misc Test accucheck daily PRN      !! Glucose Blood (ONETOUCH ULTRA BLUE) In Vitro Strip Use to test glucose at home daily prn      !! ACCU-CHEK SHADE PLUS In Vitro Strip       !! Accu-Chek Multiclix Lancets Does not apply Misc        !! - Potential duplicate medications found. Please discuss with provider.          Activity: activity as tolerated  Diet: cardiac diet  Wound Care: as directed  Code Status: No Order      Exam on day of discharge:     Vitals:    01/14/25 1307   BP: 128/57   Pulse: 78   Resp:    Temp:        Physical Exam:   General: no acute distress  Heart: RRR  Lungs: CTAB  Abd: Soft, NT, ND  Neuro: no focal deficits      Total time coordinating care for discharge: Greater than 30 minutes    SHANAE Ortega [facility] Hospitalist

## 2025-01-14 NOTE — PLAN OF CARE
POD#0 L TKA. AxO4. VSS on 2-3L O2, ALEXIA w/ CPAP - own mask and tubing @ bedside. SCDs, on ASA 81. Denies nausea during shift, tolerating PO intake. Voiding w/o issue on bedside commode. Neurovasc intact - reports numbness from L ankle to knee, palpable pulses, cap refill <3, able to dorsi/plantar flex. Attempted to ambulate at beginning of shift, buckling observed, able to stand pivot to commode. PT/OT to see. Updated on POC. Safety measures placed. Care ongoing.

## 2025-01-15 ENCOUNTER — TELEPHONE (OUTPATIENT)
Facility: CLINIC | Age: 75
End: 2025-01-15

## 2025-01-15 NOTE — TELEPHONE ENCOUNTER
Pt notified that [facility] supplied a temporary supply to pt of Fluticasone Salmeterol.  Instructed pt to call [facility] to find out which inhaler is preferred.  Pt verbalizes understanding.

## 2025-04-07 NOTE — PROGRESS NOTES
[facility]  SLEEP PROGRESS NOTE        HPI:   This is a 74 year old female coming in for   Chief Complaint   Patient presents with    Obstructive Sleep Apnea (ALEXIA)     Pt here for annual check up.  Pt states no issues at this time. Sleep questionnaire:        HPI:   Had knee surgery  Going to bed 8-930  Wakes anywhere 2am-5, takes hours to fall back to sleep  Will sleep again 8-930   Less afternoon naps    Also treated for RLS with pramipexole 0.25mg one tablet  Treated for migraines--Shea Davis  2025 - 2025  Patient ID: [id number]  : 1950  Age: 74 years  Gender: Female  [place]  [id number]  [facility] [address]  Phone: [phone]  Fax: [phone]  Email: [email or web address]  Compliance Report  Compliance  Payor Standard  Usage 2025 - 2025  Usage days 86/90 days (96%)  >= 4 hours 62 days (69%)  < 4 hours 24 days (27%)  Usage hours 426 hours 28 minutes  Average usage (total days) 4 hours 44 minutes  Average usage (days used) 4 hours 58 minutes  Median usage (days used) 5 hours 4 minutes  Total used hours (value since last reset - 2025) 4,034 hours  AirSense 11 AutoSet  Serial number [id number]  Mode AutoSet  Min Pressure 10 cmH2O  Max Pressure 20 cmH2O  EPR Off  Response Standard  Therapy  Pressure - cmH2O Median: 11.0 95th percentile: 12.4 Maximum: 13.1  Leaks - L/min Median: 1.6 95th percentile: 23.8 Maximum: 34.0  Events per hour AI: 0.3 HI: 0.2 AHI: 0.5  Apnea Index Central: 0.1 Obstructive: 0.0 Unknown: 0.2  RERA Index 0.3  Cheyne-Valero respiration (average duration per night) 0 minutes (0%)    Patient: Sleep review of systems today: see form.      Pt  PCP:  No primary care provider on file.  No referring provider defined for this encounter.           No data to display                    Past Medical History:    Asthma (HCC)    Back problem    Depression    Diabetes (HCC)    Diabetes type I (HCC)    Disorder of liver    FATTY LIVER     Esophageal reflux    Essential hypertension    Hearing impaired person, bilateral    HEARING AIDS    Hearing loss    wears a hearing aid    High blood pressure    High cholesterol    Hx of motion sickness    Hypertension    Migraine    Migraines    Obesity    Osteoarthritis    Other and unspecified hyperlipidemia    PONV (postoperative nausea and vomiting)    Reactive airway disease (HCC)    Pulmonary    Restless leg syndrome    Rheumatoid arthritis (HCC)    Dr Smith    Sleep apnea    CPAP    Visual impairment    GLASSES     Past Surgical History:   Procedure Laterality Date    Carpal tunnel release      [place]    Knee replacement surgery  2014    right          Tonsillectomy       Social History:  Social History     Social History Narrative    Not on file     Social History     Socioeconomic History    Marital status:    Tobacco Use    Smoking status: Never    Smokeless tobacco: Never   Vaping Use    Vaping status: Never Used   Substance and Sexual Activity    Alcohol use: Yes     Comment: SOCIALLY    Drug use: Never     Social Drivers of Health     Food Insecurity: No Food Insecurity (2025)    Food Insecurity     Food Insecurity: Never true   Transportation Needs: No Transportation Needs (2025)    Transportation Needs     Lack of Transportation: No   Housing Stability: Low Risk  (2025)    Housing Stability     Housing Instability: No     Family History:  Family History   Problem Relation Age of Onset    Diabetes Father     Heart Disorder Father     Lipids Mother     Cancer Mother     Cancer Maternal Grandfather     Cancer Brother 65        prostate cancer      Allergies:  Allergies[1]  Current Meds:  Current Outpatient Medications   Medication Sig Dispense Refill    divalproex  MG Oral Tablet 24 Hr Take 1 tablet (250 mg total) by mouth in the morning and 1 tablet (250 mg total) before bedtime.      acetaminophen 325 MG Oral Tab Take 2 tablets (650 mg total) by mouth  every 4 (four) hours. Post op      celecoxib 200 MG Oral Cap Take 1 capsule (200 mg total) by mouth daily. Post op      Multiple Vitamin (MULTIVITAMINS) Oral Cap Take 1 capsule by mouth daily.      rosuvastatin 10 MG Oral Tab Take 1 tablet (10 mg total) by mouth nightly.      losartan-hydroCHLOROthiazide 50-12.5 MG Oral Tab Take 1 tablet by mouth daily.      Esomeprazole Magnesium 40 MG Oral Capsule Delayed Release Take 1 capsule (40 mg total) by mouth every morning before breakfast.      Magnesium Oxide (MAGNESIUM EXTRA STRENGTH OR) Take 1 tablet by mouth daily.      metFORMIN HCl 1000 MG Oral Tab Take 1 tablet (1,000 mg total) by mouth 2 (two) times daily with meals.      Trospium Chloride 20 MG Oral Tab Take 1 tablet (20 mg total) by mouth 2 (two) times daily.      carvedilol 12.5 MG Oral Tab Take 1 tablet (12.5 mg total) by mouth 2 (two) times daily with meals.      ALBUTEROL 108 (90 Base) MCG/ACT Inhalation Aero Soln INHALE 2 PUFFS BY MOUTH EVERY 4-6 HOURS AS NEEDED 8.5 each 6    SUMATRIPTAN SUCCINATE 100 MG Oral Tab TAKE 1 TABLET BY MOUTH EVERY 2 HOURS AS NEEDED FOR MIGRAINE. 12 tablet 5    Pramipexole Dihydrochloride 0.25 MG Oral Tab Take 2 tablets (0.5 mg total) by mouth nightly. 180 tablet 3    OneTouch Delica Lancets 33G Does not apply Misc Use to test glucose once daily as directed 100 each 0    Glucose Blood (ONETOUCH ULTRA) In Vitro Strip Use to test glucose once daily as directed 100 each 0    traMADol HCl 50 MG Oral Tab Take 1 tablet (50 mg total) by mouth every 6 (six) hours as needed for Pain.      folic acid 1 MG Oral Tab Take 1 tablet (1 mg total) by mouth daily.      Probiotic Product (PROBIOTIC-10) Oral Chew Tab Chew 1 capsule by mouth daily.      Multiple Vitamins-Minerals (MULTIVITAMIN ADULT EXTRA C OR) Take 1 tablet by mouth daily.      Ascorbic Acid (VITAMIN C) 500 MG Oral Cap Take 1 tablet by mouth daily.      Glucosamine Sulfate 500 MG Oral Tab Take 1 tablet by mouth daily.      ONETOUCH  LANCETS Does not apply Misc Test accucheck daily  each 3    Glucose Blood (ONETOUCH ULTRA BLUE) In Vitro Strip Use to test glucose at home daily prn 100 strip 3    methotrexate 2.5 MG Oral Tab Take 4 tablets (10 mg total) by mouth every 7 days.      ACCU-CHEK SHADE PLUS In Vitro Strip       Accu-Chek Multiclix Lancets Does not apply Misc       cephALEXin 500 MG Oral Cap Take 1 capsule (500 mg total) by mouth 2 (two) times daily. Post op (Patient not taking: Reported on 4/8/2025)      sennosides-docusate 8.6-50 MG Oral Tab Take 1 tablet by mouth daily. Post op (Patient not taking: Reported on 4/8/2025)      oxyCODONE 5 MG Oral Tab Take 1 tablet (5 mg total) by mouth every 4 (four) hours as needed for Pain. Post op (Patient not taking: Reported on 4/8/2025)      aspirin (ASPIRIN EC LOW DOSE) 81 MG Oral Tab EC Take 1 tablet (81 mg total) by mouth 2 (two) times daily. To take for 6 weeks total to prevent blood clots. (Patient not taking: Reported on 4/8/2025)      zolpidem 5 MG Oral Tab Take 2 tablets (10 mg total) by mouth nightly as needed for Sleep. (Patient not taking: Reported on 4/8/2025) 30 tablet 5    cholecalciferol 1000 UNITS Oral Cap Take 1 capsule (1,000 Units total) by mouth daily. (Patient not taking: Reported on 4/8/2025)        Counseling given: Not Answered         Problem List:  Patient Active Problem List   Diagnosis    Tenosynovitis of foot and ankle    Closed fracture of unspecified bone(s) of foot (except toes)    Sprain of foot, unspecified site    Strain of foot, right, initial encounter    Intractable migraine without aura and without status migrainosus    RLS (restless legs syndrome)    Asthma (HCC)    Depressive disorder    Gastroesophageal reflux disease    Generalized osteoarthritis    Hyperlipidemia    Inflammatory polyarthropathy (HCC)    Pure hypercholesterolemia    Essential hypertension    Menopausal symptom    Migraine    Tendinopathy of left rotator cuff    Subacromial  impingement of left shoulder    Biceps tendinopathy, left    Rheumatoid arthritis (HCC)    Osteoarthritis of left acromioclavicular joint    Recurrent major depressive disorder, in full remission    Controlled type 2 diabetes mellitus without complication, without long-term current use of insulin (HCC)    Obstructive sleep apnea    Hypoxemia associated with sleep    Primary insomnia    Class 2 obesity due to excess calories without serious comorbidity in adult    Chronic cough    Insomnia due to medical condition       REVIEW OF SYSTEMS:   Review of Systems    EXAM:   /90   Pulse 77   Resp 16   Ht 4' 9\" (1.448 m)   Wt 191 lb (86.6 kg)   SpO2 98%   BMI 41.33 kg/m²  Estimated body mass index is 41.33 kg/m² as calculated from the following:    Height as of this encounter: 4' 9\" (1.448 m).    Weight as of this encounter: 191 lb (86.6 kg).   Neck in inches:      Wt Readings from Last 6 Encounters:   04/08/25 191 lb (86.6 kg)   01/13/25 186 lb 8.2 oz (84.6 kg)   01/09/25 185 lb (83.9 kg)   06/25/24 199 lb (90.3 kg)   04/02/24 197 lb (89.4 kg)   12/28/23 196 lb (88.9 kg)     BP Readings from Last 3 Encounters:   04/08/25 134/90   01/14/25 128/57   01/09/25 128/70     Pulse Readings from Last 3 Encounters:   04/08/25 77   01/14/25 78   01/09/25 77     SpO2 Readings from Last 3 Encounters:   04/08/25 98%   01/14/25 94%   01/09/25 96%      Patient must be at least 60 in tall to calculate ideal body weight    Vital signs reviewed.  Physical Exam    ASSESSMENT AND PLAN:   1. Insomnia due to medical condition  Advanced sleep phase zolpidem has not been effective  Try zolpidem 6.25mg    2. Obstructive sleep apnea  Using nasal mask and comfortable, sleeps prone  Patient is using and benefiting from regular cpap use.  Patient was instructed to clean equipment on a weekly basis.  Patient was instructed to keep up to date with supplies.  Patient was informed to avoid drowsy driving, or using heavy machinery.  Patient was  instructed to followup on a annual basis.   There are no Patient Instructions on file for this visit.    Independent interpretation of Sleep Download as defined above.  Continue with Rx management of Sleep apnea with PAP therapy.    COMPLIANCE is required by insurance for 4 hours a night most nights of the week.    Advised if still with sleep apnea and not using CPAP has a 7 fold increase in risk of heart attack, stroke, abnormal heart rhythm  and death,  increased risk of driving accidents.     Advised to refrain from driving when sleepy.      Recommend weight loss, and maintain and optimal BMI with Exercise 30 minutes most days to target heart rate .     Advised patient to change filters,masks,hoses  and tubes and equiptment on a  regular schedule.    Filters and seals shall be changed every 1 month,  Hoses every 3 months,   CPAP mask and humidifier chamber changed every 6 month  with the durable medical equipment provider.         Meds & Refills for this Visit:  Requested Prescriptions     Pending Prescriptions Disp Refills    Zolpidem Tartrate ER 6.25 MG Oral Tab CR 30 tablet 5     Sig: Take 2 tablets (12.5 mg total) by mouth nightly as needed for Sleep.       Outcome: Parent verbalizes understanding. Parent is notified to call with any questions, complications, allergies, or worsening or changing symptoms.  Parent is to call with any side effects or complications from the treatments as a result of today.     \" This note was created utilizing Dragon speech recognition software.  Please excuse any grammatical errors. Call my office if you have any questions regarding this note. \"     Mario Dc,   4/8/2025  9:20 AM       [1] No Known Allergies

## 2025-04-08 ENCOUNTER — OFFICE VISIT (OUTPATIENT)
Facility: CLINIC | Age: 75
End: 2025-04-08
Payer: MEDICARE

## 2025-04-08 VITALS
RESPIRATION RATE: 16 BRPM | SYSTOLIC BLOOD PRESSURE: 134 MMHG | HEART RATE: 77 BPM | WEIGHT: 191 LBS | DIASTOLIC BLOOD PRESSURE: 90 MMHG | OXYGEN SATURATION: 98 % | BODY MASS INDEX: 41.21 KG/M2 | HEIGHT: 57 IN

## 2025-04-08 DIAGNOSIS — G47.01 INSOMNIA DUE TO MEDICAL CONDITION: Primary | ICD-10-CM

## 2025-04-08 DIAGNOSIS — G47.33 OBSTRUCTIVE SLEEP APNEA: ICD-10-CM

## 2025-04-08 PROCEDURE — 99214 OFFICE O/P EST MOD 30 MIN: CPT | Performed by: OTHER

## 2025-04-08 RX ORDER — ZOLPIDEM TARTRATE 6.25 MG/1
12.5 TABLET, FILM COATED, EXTENDED RELEASE ORAL NIGHTLY PRN
Qty: 30 TABLET | Refills: 5 | Status: SHIPPED | OUTPATIENT
Start: 2025-04-08

## 2025-07-07 NOTE — PROGRESS NOTES
Shea Mixon  2025 - 2025  Patient ID: [id number]  : 1950  Age: 74 years  Gender: Female  [place]  [address]  [facility], [address]  Phone: [phone]  Fax: [phone]  Email: [email or web address]  Compliance Report  Compliance  Payor Standard  Usage 2025 - 2025  Usage days 90/90 days (100%)  >= 4 hours 73 days (81%)  < 4 hours 17 days (19%)  Usage hours 531 hours 28 minutes  Average usage (total days) 5 hours 54 minutes  Average usage (days used) 5 hours 54 minutes  Median usage (days used) 6 hours 8 minutes  Total used hours (value since last reset - 2025) 4,570 hours  AirSense 11 AutoSet  Serial number [id number]  Mode AutoSet  Min Pressure 10 cmH2O  Max Pressure 20 cmH2O  EPR Off  Response Standard  Therapy  Pressure - cmH2O Median: 11.1 95th percentile: 13.1 Maximum: 14.1  Leaks - L/min Median: 4.1 95th percentile: 27.3 Maximum: 35.4  Events per hour AI: 0.6 HI: 0.1 AHI: 0.7  Apnea Index Central: 0.1 Obstructive: 0.1 Unknown: 0.4  RERA Index 0.5  Cheyne-Valero respiration (average duration per night) 0 minutes (0%)

## 2025-07-08 ENCOUNTER — OFFICE VISIT (OUTPATIENT)
Facility: CLINIC | Age: 75
End: 2025-07-08
Payer: MEDICARE

## 2025-07-08 VITALS
SYSTOLIC BLOOD PRESSURE: 134 MMHG | DIASTOLIC BLOOD PRESSURE: 90 MMHG | BODY MASS INDEX: 40.56 KG/M2 | OXYGEN SATURATION: 98 % | HEART RATE: 76 BPM | WEIGHT: 188 LBS | RESPIRATION RATE: 16 BRPM | HEIGHT: 57 IN

## 2025-07-08 DIAGNOSIS — R05.3 CHRONIC COUGH: ICD-10-CM

## 2025-07-08 DIAGNOSIS — G47.33 OBSTRUCTIVE SLEEP APNEA: Primary | ICD-10-CM

## 2025-07-08 PROCEDURE — 99214 OFFICE O/P EST MOD 30 MIN: CPT | Performed by: INTERNAL MEDICINE

## 2025-07-08 RX ORDER — AMOXICILLIN 500 MG/1
4 TABLET, FILM COATED ORAL AS NEEDED
COMMUNITY
Start: 2025-03-21

## 2025-07-08 RX ORDER — PANTOPRAZOLE SODIUM 40 MG/1
40 TABLET, DELAYED RELEASE ORAL DAILY
COMMUNITY
Start: 2025-01-28

## 2025-07-08 NOTE — PATIENT INSTRUCTIONS
Plan:  -plan to resume advair at first sign of illness , traveling , or if you feel like asthma - coughing etc -  -- RESUME advair twice a day starting 2 days prior to surgery                 - consider trial of using nexium just in am -- ok to take in evening if needed   - continue  flonase  one puff  each nostril every night as needed   - keep up good work with machine   - see me in 6 months     Samantha Munoz MD  Pulmonary Medicine  7/8/25

## 2025-07-08 NOTE — PROGRESS NOTES
Pulmonary Progress Note    History of Present Illness:  Shea Mixon is a 74 year old female presenting to pulmonary clinic -  Traveling in jan   Coughs at night - snorts and snoring- and awakening   Sleep study 1/29 plans for repeat with no oxygen   Using flonase and saline -   Using wedge a very high one -   Ok to fall asleep - then awakens -   Remains with cough   No dyspnea     Javier giraldo for injection pain - - back - short lived relief   Much better now- - and recently saw ortho - dr nayak - kerry- left knee - had xrays   Ongoing pain - had rt replacement   No dyspnea   Remains on flonase and saline   Cough is much better -- some coughing at night - per  -   To get new machine - today - - dubeck-     Has been sick-- for 3 weeks told bronchitis   Had neg cxr   Neg covid   More coughing - at night now - nebulizer 4 times a day and prednsione   Started on Adavir twice a day in place of Breo   No cp  No fever - cough -   No med changes   Wearing machine -every night - -still with insomnia and still awakening at 3am - and 1-2 hours to go back to sleep   - not rested- naps during the day -     12.23   Doing well - using machine - remains on machine though with some insomnia-- better since sleeping separately   Cough is much better-- some at night and less with machine - occasionally  coughs   No recent illness and no dyspnea   Last rescue use in [place] - river cruise - got sick vomiting   Seeing cardio with HTN-   No new issues with dr munoz - methotrexate - 4 tabs-   High WBC related to steroid injection - to recheck   Remained on advair daily with traveling-     6/24- doing well now--had stopped PPI  few weeks ago cough resumed- and then had a cold few months ago-saw primary - and recommended PPI - and helped cough- then stopped and cough recurred - now on twice a day -   Resumed advair while traveling -- then stopped-   No rescue inhaler at all -   No dyspnea - no limiation though no exercise - climbs stairs  and walks the dog   Increased RA-- on tramodaol - one a day at most and methotrexate 4 tabs a week     1/25- doing OK - /well   Used albuterol once in past 6 months -   Resumed advair with travel - daily - then stopped - not needed -   For knee replacement - on Monday - - bone on bone - [place] - dr babcock   To see in am     7/25 had great experience at [place] with knee replacement - -   Done with PT-- able to walk stairs easier--   Walking and drivng   Last week - vaginal ?  bleeding -- polyp in uterus - removed last Tuesday --   Umbilical hernia - [place] waiting for surgery   Dr de santiago- as primary   Saw dr munoz -- RA doing better- remains on methotrexate 4 pills a week   No resp issues with surgery - now has stopped advair -   No rescue use at all -   Using advair when traveling   Sleeping a lot better-                       Social History:   Social History     Socioeconomic History    Marital status:    Tobacco Use    Smoking status: Never    Smokeless tobacco: Never   Vaping Use    Vaping status: Never Used   Substance and Sexual Activity    Alcohol use: Yes     Comment: SOCIALLY    Drug use: Never        Medications:   Current Outpatient Medications   Medication Sig Dispense Refill    amoxicillin 500 MG Oral Tab Take 4 tablets (2,000 mg total) by mouth as needed.      pantoprazole 40 MG Oral Tab EC Take 1 tablet (40 mg total) by mouth daily.      divalproex  MG Oral Tablet 24 Hr Take 1 tablet (250 mg total) by mouth in the morning and 1 tablet (250 mg total) before bedtime.      acetaminophen 325 MG Oral Tab Take 2 tablets (650 mg total) by mouth every 4 (four) hours. Post op      rosuvastatin 10 MG Oral Tab Take 1 tablet (10 mg total) by mouth nightly.      losartan-hydroCHLOROthiazide 50-12.5 MG Oral Tab Take 1 tablet by mouth daily.      metFORMIN HCl 1000 MG Oral Tab Take 1 tablet (1,000 mg total) by mouth 2 (two) times daily with meals.      carvedilol 12.5 MG Oral Tab Take 1  tablet (12.5 mg total) by mouth 2 (two) times daily with meals.      ALBUTEROL 108 (90 Base) MCG/ACT Inhalation Aero Soln INHALE 2 PUFFS BY MOUTH EVERY 4-6 HOURS AS NEEDED 8.5 each 6    Glucose Blood (ONETOUCH ULTRA) In Vitro Strip Use to test glucose once daily as directed 100 each 0    traMADol HCl 50 MG Oral Tab Take 1 tablet (50 mg total) by mouth every 6 (six) hours as needed for Pain.      folic acid 1 MG Oral Tab Take 1 tablet (1 mg total) by mouth daily.      Probiotic Product (PROBIOTIC-10) Oral Chew Tab Chew 1 capsule by mouth daily.      Glucose Blood (ONETOUCH ULTRA BLUE) In Vitro Strip Use to test glucose at home daily prn 100 strip 3    methotrexate 2.5 MG Oral Tab Take 4 tablets (10 mg total) by mouth every 7 days.      ACCU-CHEK SHADE PLUS In Vitro Strip       aspirin (ASPIRIN EC LOW DOSE) 81 MG Oral Tab EC Take 1 tablet (81 mg total) by mouth 2 (two) times daily. To take for 6 weeks total to prevent blood clots. (Patient not taking: Reported on 7/8/2025)      Multiple Vitamin (MULTIVITAMINS) Oral Cap Take 1 capsule by mouth daily.      Esomeprazole Magnesium 40 MG Oral Capsule Delayed Release Take 1 capsule (40 mg total) by mouth every morning before breakfast.      Magnesium Oxide (MAGNESIUM EXTRA STRENGTH OR) Take 1 tablet by mouth daily.      Trospium Chloride 20 MG Oral Tab Take 1 tablet (20 mg total) by mouth 2 (two) times daily.      SUMATRIPTAN SUCCINATE 100 MG Oral Tab TAKE 1 TABLET BY MOUTH EVERY 2 HOURS AS NEEDED FOR MIGRAINE. 12 tablet 5    Pramipexole Dihydrochloride 0.25 MG Oral Tab Take 2 tablets (0.5 mg total) by mouth nightly. (Patient not taking: Reported on 7/8/2025) 180 tablet 3    OneTouch Delica Lancets 33G Does not apply Misc Use to test glucose once daily as directed 100 each 0    Multiple Vitamins-Minerals (MULTIVITAMIN ADULT EXTRA C OR) Take 1 tablet by mouth daily.      cholecalciferol 1000 UNITS Oral Cap Take 1 capsule (1,000 Units total) by mouth daily.      Ascorbic Acid  (VITAMIN C) 500 MG Oral Cap Take 1 tablet by mouth daily.      Glucosamine Sulfate 500 MG Oral Tab Take 1 tablet by mouth daily.      ONETOUCH LANCETS Does not apply Misc Test accucheck daily  each 3    Accu-Chek Multiclix Lancets Does not apply Misc          Review of Systems:    Weight is up -- again-- increased metformin -- to consider ozempic -- never did go on   Had lost 15 pounds now back up a few  No gerd - remains on ppi daily - some coming and going and with increased cough at times that gets better with ppi - no issues - occasionally  at night   No dairrhea   No swelling   Joints good and bad days -- dr munoz - no changes   Sleeping with some difficulty -now with new machine  and using nightly with encouragement from her -continues with oxygen when not using- using daily now   Now doing well with machine     Physical Exam:  /86   Pulse 76   Resp 16   Ht 4' 9\" (1.448 m)   Wt 188 lb (85.3 kg)   SpO2 98%   BMI 40.68 kg/m²    Constitutional: comfortable . No acute distress.  Sounds clear   HEENT: Head NC/AT. PEERL. Throat is clear -  Cardio: Regular rate and rhythm. Normal S1 and S2.   Resp   rare crackle that clears - no wheeze - ess clear   GI:  Abd soft, non-tender.obese   Extremities: No clubbing or cyanosis. No LE edema. No calf tenderness.  Neurologic: A&Ox3. No gross motor deficits.  Skin: Warm, dry.no rashes or hives noted   Lymphatic: No cervical or supraclavicular lymphadenopathy.no jvd   Psych: Calm, cooperative. Pleasant affect.    Results: reviewed     Assessment/Plan:  #Sleep disorder  No evidence of ALEXIA in 2008  continues to deny any apneic episodes  No evidence of progression of pulmonary hypertension continues on nocturnal O2  Increased snoring negative overnight in 2017 on O2 repeat overnight on 2 L with 2% of the time less than 90% to resume 2 L  5/21 increase noise with sleeping patient has an occasional awakening  11/21 increased awakening overnight with 4.6  events 7.4% with sats less than 90 reports not sleeping as well  8/22 worsening sleep disorder 1 set a.m.  12/22--underwent repeat sleep study with AHI 5.3 when supine 7.6 remy 81%  Agreeable to titration as patient is sleeping very poorly  3.23- titrated  and awaiting machine   5/23 reports using and benefiting though struggling at times-plans to follow-up with Dr. Dc concerning role of additional oxygen  12.23- doing very well on machine with AHI 0.5 -- also separate rooms - and less insomnia   6/24- doing well with ahi 0.4 -- needs to sleep more   1/25- doing well with machine - ahi 0.3   7/25 overall doing much better minimal leak more time with mask on and AHI 0.7    #Rheumatoid arthritis seronegative  Remains on methotrexate though at weaning doses  [facility]s [facility] at [facility]  Tolerating methotrexate wean--then required repeat increased dosing  3.23- decreasing -methotrexate dosing dropping   And stable   12.23- 4 pills and follows with dr munoz   6/24- remains on same meds-- methotrexate x 4- added tramadol   1/25- remains with methotrexate- controlled -   7/25 no new issues remains on methotrexate with Dr. Munoz        #GE reflux  Very stable on PPI insurance stopped paying  Tried to wean needs daily PPI notes breakthrough with H2 blocker only  11/19 with resurgence of GERD and coughing to resume PPI 10/20 heartburn with coffee  8/22 denies  3/23 remains on daily PPI  5.23- was bad for a while - now better - - takes nexium -- with some breakthrough--to trial pepcid - as not suppose to take tums   12.23- occasionally  breakthrough - remains on daily PPI nexium   6/24- resurgance and now on BID-- - cough seemed to resurge with  gerd-when had stopped her PPI transient- now on BID   1/25- remains on twice daily-consider decrease to daily after surgery  7/25 remains on twice daily without symptoms plan to wean and following      #Pulmonary hypertension  Had follow-up echo 2013 PAS  went down from 40 to 29 mmHg  Evidence of diastolic dysfunction mild and improved with normal LVEF no recent issues remains on hydrochlorothiazide  12/22 notes increasing blood pressure--did not take HCTZ this a.m. now reports taking it faithfully--patient will call primary for need for increased medication  12/23 stable status  6/24- stable fluid status denies any symptoms  1/25- no swelling on hydrochlorothiazide  7/25 fluid status remains stable      #Allergic rhinitis  Waxing and waning over the years  Improves with nasal steroids/Qnasl  Worse in the spring  8/22 resumed Flonase  12/22 ongoing cough  present management  3/23 thinks mostly controlled  12.23- controlled - remains on flonase daily   6/24- remains stable on flonase   1/25- remains flonase daily         #Cough/asthma     History of mild flares of bronchitis/airway obstruction in the past normal spirometry  Cough much improved with Breo used transiently  Clear chest x-ray no evidence of methotrexate issues  11/19 minimal resurgence but had stopped PPI nasal steroids and Breo-to resume serially  10/20 doing well denies cough-  8/22 remains controlled  12/22 increasing cough denies dyspnea-exam with mild rhonchi plans to resume Breo and follow  3.23 - - now clear - to stop ics/laba and follow   For pFTS   5/23 now with prolonged flare post presumed viral--seems predominantly upper airway-to complete PFTs on hold until clinically better  12.23- PFTS all nornal with DLCO increased from 52% to 81%   Using advair as needed and when sick and traveling   6/24- remains stable - - as needed and with travel -   1/25- doing well on as needed IC  7/25 did well with knee replacement--now off and doing well plan to resume for abdominal surgery    # pre-op   For knee - scheduled for Monday   Discussed importance of anesthesia and CPAP perioperatively  7/25 did well -to resume ICS/LABA preop umbilical hernia repair      #Depression/anxiety  Remains on  medication      -Plan:  -plan to resume advair at first sign of illness , traveling , or if you feel like asthma - coughing etc -  -- RESUME advair twice a day starting 2 days prior to surgery                 - consider trial of using nexium just in am -- ok to take in evening if needed   - continue  flonase  one puff  each nostril every night as needed   - keep up good work with machine   - see me in 6 months     Samantha Munoz MD  Pulmonary Medicine  7/8/25

## (undated) DEVICE — BLADE RMR 29MM PAT SS W/ PILOT H

## (undated) DEVICE — NEEDLE SPNL 18GA L3.5IN PNK QNCKE STYL DISP

## (undated) DEVICE — BANDAGE,COHESIVE,TAN,4X5YD,LF,STRL: Brand: MEDLINE

## (undated) DEVICE — COVER,LIGHT,CAMERA,HARD,1/PK,STRL: Brand: MEDLINE

## (undated) DEVICE — ADHESIVE SKIN TOP FOR WND CLSR DERMBND ADV

## (undated) DEVICE — SUT MCRYL 3-0 27IN ABSRB UD 19MM PS-2 3/8

## (undated) DEVICE — GLOVE SUR 7.5 SENSICARE PI PIP GRN PWD F

## (undated) DEVICE — HOOD, PEEL-AWAY: Brand: FLYTE

## (undated) DEVICE — SUT COAT VCRL 0 27IN CT-1 ABSRB UD 36MM 1/2

## (undated) DEVICE — APPLICATOR PREP 26ML CHG 2% ISO ALC 70%

## (undated) DEVICE — BIPOLAR SEALER 23-112-1 AQM 6.0: Brand: AQUAMANTYS™

## (undated) DEVICE — SPONGE 4X4 10PK

## (undated) DEVICE — DRAPE,U/SHT,SPLIT,FILM,60X84,STERILE: Brand: MEDLINE

## (undated) DEVICE — ANTISEPTIC 4OZ 70% ISO ALC

## (undated) DEVICE — TOTAL KNEE CDS: Brand: MEDLINE INDUSTRIES, INC.

## (undated) DEVICE — UNIVERSAL STERIBUMP® STERILE (5/CASE): Brand: UNIVERSAL STERIBUMP®

## (undated) DEVICE — SUT STRATAFIX SPRL MCRYL+ 2-0 27IN CT-1 ABSRB

## (undated) DEVICE — CEMENT MIXING SYSTEM WITH FEMORAL BREAKWAY NOZZLE: Brand: REVOLUTION

## (undated) DEVICE — STRYKER PERFORMANCE SERIES SAGITTAL BLADE: Brand: STRYKER PERFORMANCE SERIES

## (undated) DEVICE — STOCKINETTE,IMPERVIOUS,12X48,STERILE: Brand: MEDLINE

## (undated) DEVICE — SOLUTION IRRIG 3000ML 0.9% NACL FLX CONT

## (undated) DEVICE — GLOVE SUR 7 SENSICARE PI PIP CRM PWD F

## (undated) DEVICE — GLOVE SUR 6.5 SENSICARE PI PIP GRN PWD F

## (undated) DEVICE — NEPTUNE E-SEP SMOKE EVACUATION PENCIL, COATED, 70MM BLADE, PUSH BUTTON SWITCH: Brand: NEPTUNE E-SEP

## (undated) DEVICE — CONTAINER,SPECIMEN,PNEU TUBE,4OZ,OR STRL: Brand: MEDLINE

## (undated) DEVICE — MEDI-VAC NON-CONDUCTIVE SUCTION TUBING: Brand: CARDINAL HEALTH

## (undated) DEVICE — SYRINGE MED 30ML STD CLR PLAS LL TIP N CTRL

## (undated) DEVICE — VIOLET BRAIDED (POLYGLACTIN 910), SYNTHETIC ABSORBABLE SUTURE: Brand: COATED VICRYL

## (undated) DEVICE — 2T11 #2 PDO 36 X 36: Brand: 2T11 #2 PDO 36 X 36

## (undated) DEVICE — GLOVE SUR 6.5 SENSICARE PI PIP CRM PWD F

## (undated) DEVICE — WRAP COMPR UNIV KNEE HOT CLD GEL MICWV AND

## (undated) DEVICE — SLEEVE COMPR MD KNEE LEN SGL USE KENDALL SCD

## (undated) DEVICE — HOOD: Brand: FLYTE

## (undated) DEVICE — SHEET,DRAPE,70X100,STERILE: Brand: MEDLINE

## (undated) NOTE — LETTER
OUTSIDE TESTING RESULT REQUEST     IMPORTANT: FOR YOUR IMMEDIATE ATTENTION  Please FAX all test results listed below to: [phone]     Testing already done on or about: 24     * * * * If testing is NOT complete, arrange with patient A.S.A.P. * * * *    Patient Name: Shea Mixon  Surgery Date: 2025  Medical Record: [id number]  CSN: [id number]  : 1950 - A: 74 y     Sex: female  Surgeon(s):  Richard Ahumada MD  Procedure: LEFT TOTAL KNEE ARTHROPLASTY  Anesthesia Type: Spinal     Surgeon: Richard Ahumada MD     The following Testing and Time Line are REQUIRED PER ANESTHESIA     EKG READ AND SIGNED WITHIN   90 days  CBC, Platelet; NO Differential within  90 days  BMP (requires 4 hour fast) within  90 days  MSSA/MRSA Nasal screening within 30 days      Thank You,   Sent by: NANDINI Ribeiro

## (undated) NOTE — LETTER
OUTSIDE TESTING RESULT REQUEST     IMPORTANT: FOR YOUR IMMEDIATE ATTENTION  Please FAX all test results listed below to: [phone]     Testing already done on or about: 2024     * * * * If testing is NOT complete, arrange with patient A.S.A.P. * * * *      Patient Name: Shea Mixon  Surgery Date: 2025  Medical Record: [id number]  CSN: [id number]  : 1950 - A: 74 y     Sex: female  Surgeon(s):  Richard Ahumada MD  Procedure: LEFT TOTAL KNEE ARTHROPLASTY  Anesthesia Type: Spinal     Surgeon: Richard Ahumada MD     The following Testing and Time Line are REQUIRED PER ANESTHESIA     MSSA/MRSA Nasal screening within 30 days      Thank You,   Sent by: Yuki DELATORRE